# Patient Record
Sex: FEMALE | Race: WHITE | NOT HISPANIC OR LATINO | Employment: UNEMPLOYED | URBAN - METROPOLITAN AREA
[De-identification: names, ages, dates, MRNs, and addresses within clinical notes are randomized per-mention and may not be internally consistent; named-entity substitution may affect disease eponyms.]

---

## 2017-02-10 ENCOUNTER — HOSPITAL ENCOUNTER (EMERGENCY)
Facility: MEDICAL CENTER | Age: 27
End: 2017-02-10

## 2017-02-10 ENCOUNTER — HOSPITAL ENCOUNTER (EMERGENCY)
Facility: MEDICAL CENTER | Age: 27
End: 2017-02-10
Attending: EMERGENCY MEDICINE
Payer: MEDICAID

## 2017-02-10 ENCOUNTER — APPOINTMENT (OUTPATIENT)
Dept: RADIOLOGY | Facility: MEDICAL CENTER | Age: 27
End: 2017-02-10
Attending: EMERGENCY MEDICINE
Payer: MEDICAID

## 2017-02-10 VITALS
HEART RATE: 85 BPM | DIASTOLIC BLOOD PRESSURE: 65 MMHG | OXYGEN SATURATION: 97 % | RESPIRATION RATE: 16 BRPM | BODY MASS INDEX: 27.31 KG/M2 | TEMPERATURE: 97.9 F | WEIGHT: 160 LBS | HEIGHT: 64 IN | SYSTOLIC BLOOD PRESSURE: 132 MMHG

## 2017-02-10 DIAGNOSIS — S20.212A CHEST WALL CONTUSION, LEFT, INITIAL ENCOUNTER: ICD-10-CM
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
## 2017-02-10 PROCEDURE — 71010 DX-CHEST-LIMITED (1 VIEW): CPT

## 2017-02-10 PROCEDURE — 307740 HCHG GREEN TRAUMA TEAM SERVICES

## 2017-02-10 PROCEDURE — 99284 EMERGENCY DEPT VISIT MOD MDM: CPT

## 2017-02-10 NOTE — ED AVS SNAPSHOT
2/10/2017          Frances Panchal  No address on file.    Dear Frances:    [facility] wants to ensure your discharge home is safe and you or your loved ones have had all your questions answered regarding your care after you leave the hospital.    You may receive a telephone call within two days of your discharge.  This call is to make certain you understand your discharge instructions as well as ensure we provided you with the best care possible during your stay with us.     The call will only last approximately 3-5 minutes and will be done by a nurse.    Once again, we want to ensure your discharge home is safe and that you have a clear understanding of any next steps in your care.  If you have any questions or concerns, please do not hesitate to contact us, we are here for you.  Thank you for choosing [facility] for your healthcare needs.    Sincerely,    Lopez Rankin    [facility]

## 2017-02-10 NOTE — ED AVS SNAPSHOT
ColonaryConcepts Access Code: [email or web address]  Expires: 3/12/2017  8:31 PM    Your email address is not on file at [facility].  Email Addresses are required for you to sign up for ColonaryConcepts, please contact [phone] to verify your personal information and to provide your email address prior to attempting to register for ColonaryConcepts.    Frances Panchal  No address on file    ColonaryConcepts  A secure, online tool to manage your health information     [facility]’s ColonaryConcepts® is a secure, online tool that connects you to your personalized health information from the privacy of your home -- day or night - making it very easy for you to manage your healthcare. Once the activation process is completed, you can even access your medical information using the ColonaryConcepts gregorio, which is available for free in the Apple Gregorio store or Google Play store.     To learn more about ColonaryConcepts, visit [email or web address]    There are two levels of access available (as shown below):   My Chart Features  [facility] Primary Care Doctor [facility]  Specialists [facility]  Urgent  Care Non-[facility] Primary Care Doctor   Email your healthcare team securely and privately 24/7 X X X    Manage appointments: schedule your next appointment; view details of past/upcoming appointments X      Request prescription refills. X      View recent personal medical records, including lab and immunizations X X X X   View health record, including health history, allergies, medications X X X X   Read reports about your outpatient visits, procedures, consult and ER notes X X X X   See your discharge summary, which is a recap of your hospital and/or ER visit that includes your diagnosis, lab results, and care plan X X  X     How to register for [employer]:  Once your e-mail address has been verified, follow the following steps to sign up for ColonaryConcepts.     1. Go to  [email or web address]  2. Click on the Sign Up Now box, which takes you to the New Member Sign Up page. You will need to provide the  following information:  a. Enter your lark Access Code exactly as it appears at the top of this page. (You will not need to use this code after you’ve completed the sign-up process. If you do not sign up before the expiration date, you must request a new code.)   b. Enter your date of birth.   c. Enter your home email address.   d. Click Submit, and follow the next screen’s instructions.  3. Create a lark ID. This will be your lark login ID and cannot be changed, so think of one that is secure and easy to remember.  4. Create a lark password. You can change your password at any time.  5. Enter your Password Reset Question and Answer. This can be used at a later time if you forget your password.   6. Enter your e-mail address. This allows you to receive e-mail notifications when new information is available in lark.  7. Click Sign Up. You can now view your health information.    For assistance activating your lark account, call [phone]
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
## 2017-02-11 NOTE — ED NOTES
Discharge instructions provided to pt.  Pt states understanding.  Pt states all questions have been answered.  Copy of discharge provided to pt.  Signed copy in chart.  Pt states that all personal belongings are in possession.
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
## 2017-02-11 NOTE — DISCHARGE INSTRUCTIONS
Your xray was normal. You will feel sore for the next few days. Take tylenol or motrin as need for aches and pains. Return to the ER for severe symptoms.          Cervical Sprain  A cervical sprain is an injury in the neck in which the strong, fibrous tissues (ligaments) that connect your neck bones stretch or tear. Cervical sprains can range from mild to severe. Severe cervical sprains can cause the neck vertebrae to be unstable. This can lead to damage of the spinal cord and can result in serious nervous system problems. The amount of time it takes for a cervical sprain to get better depends on the cause and extent of the injury. Most cervical sprains heal in 1 to 3 weeks.  CAUSES   Severe cervical sprains may be caused by:   · Contact sport injuries (such as from football, rugby, wrestling, hockey, auto racing, gymnastics, diving, martial arts, or boxing).    · Motor vehicle collisions.    · Whiplash injuries. This is an injury from a sudden forward and backward whipping movement of the head and neck.   · Falls.    Mild cervical sprains may be caused by:   · Being in an awkward position, such as while cradling a telephone between your ear and shoulder.    · Sitting in a chair that does not offer proper support.    · Working at a poorly designed computer station.    · Looking up or down for long periods of time.    SYMPTOMS   · Pain, soreness, stiffness, or a burning sensation in the front, back, or sides of the neck. This discomfort may develop immediately after the injury or slowly, 24 hours or more after the injury.    · Pain or tenderness directly in the middle of the back of the neck.    · Shoulder or upper back pain.    · Limited ability to move the neck.    · Headache.    · Dizziness.    · Weakness, numbness, or tingling in the hands or arms.    · Muscle spasms.    · Difficulty swallowing or chewing.    · Tenderness and swelling of the neck.    DIAGNOSIS   Most of the time your health care provider can  diagnose a cervical sprain by taking your history and doing a physical exam. Your health care provider will ask about previous neck injuries and any known neck problems, such as arthritis in the neck. X-rays may be taken to find out if there are any other problems, such as with the bones of the neck. Other tests, such as a CT scan or MRI, may also be needed.   TREATMENT   Treatment depends on the severity of the cervical sprain. Mild sprains can be treated with rest, keeping the neck in place (immobilization), and pain medicines. Severe cervical sprains are immediately immobilized. Further treatment is done to help with pain, muscle spasms, and other symptoms and may include:  · Medicines, such as pain relievers, numbing medicines, or muscle relaxants.    · Physical therapy. This may involve stretching exercises, strengthening exercises, and posture training. Exercises and improved posture can help stabilize the neck, strengthen muscles, and help stop symptoms from returning.    HOME CARE INSTRUCTIONS   · Put ice on the injured area.    ¨ Put ice in a plastic bag.    ¨ Place a towel between your skin and the bag.    ¨ Leave the ice on for 15-20 minutes, 3-4 times a day.    · If your injury was severe, you may have been given a cervical collar to wear. A cervical collar is a two-piece collar designed to keep your neck from moving while it heals.  ¨ Do not remove the collar unless instructed by your health care provider.  ¨ If you have long hair, keep it outside of the collar.  ¨ Ask your health care provider before making any adjustments to your collar. Minor adjustments may be required over time to improve comfort and reduce pressure on your chin or on the back of your head.  ¨ If you are allowed to remove the collar for cleaning or bathing, follow your health care provider's instructions on how to do so safely.  ¨ Keep your collar clean by wiping it with mild soap and water and drying it completely. If the collar  you have been given includes removable pads, remove them every 1-2 days and hand wash them with soap and water. Allow them to air dry. They should be completely dry before you wear them in the collar.  ¨ If you are allowed to remove the collar for cleaning and bathing, wash and dry the skin of your neck. Check your skin for irritation or sores. If you see any, tell your health care provider.  ¨ Do not drive while wearing the collar.    · Only take over-the-counter or prescription medicines for pain, discomfort, or fever as directed by your health care provider.    · Keep all follow-up appointments as directed by your health care provider.    · Keep all physical therapy appointments as directed by your health care provider.    · Make any needed adjustments to your workstation to promote good posture.    · Avoid positions and activities that make your symptoms worse.    · Warm up and stretch before being active to help prevent problems.    SEEK MEDICAL CARE IF:   · Your pain is not controlled with medicine.    · You are unable to decrease your pain medicine over time as planned.    · Your activity level is not improving as expected.    SEEK IMMEDIATE MEDICAL CARE IF:   · You develop any bleeding.  · You develop stomach upset.  · You have signs of an allergic reaction to your medicine.    · Your symptoms get worse.    · You develop new, unexplained symptoms.    · You have numbness, tingling, weakness, or paralysis in any part of your body.    MAKE SURE YOU:   · Understand these instructions.  · Will watch your condition.  · Will get help right away if you are not doing well or get worse.     This information is not intended to replace advice given to you by your health care provider. Make sure you discuss any questions you have with your health care provider.     Document Released: 10/14/2008 Document Revised: 12/23/2014 Document Reviewed: 06/25/2014  [employer] Interactive Patient Education ©2016 [employer]
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
## 2017-05-07 ENCOUNTER — HOSPITAL ENCOUNTER (EMERGENCY)
Dept: HOSPITAL 8 - ED | Age: 27
Discharge: HOME | End: 2017-05-07
Payer: MEDICAID

## 2017-05-07 VITALS — HEIGHT: 64 IN | WEIGHT: 158.73 LBS | BODY MASS INDEX: 27.1 KG/M2

## 2017-05-07 VITALS — DIASTOLIC BLOOD PRESSURE: 70 MMHG | SYSTOLIC BLOOD PRESSURE: 118 MMHG

## 2017-05-07 DIAGNOSIS — H60.591: Primary | ICD-10-CM

## 2017-05-07 PROCEDURE — 99283 EMERGENCY DEPT VISIT LOW MDM: CPT

## 2017-12-22 ENCOUNTER — HOSPITAL ENCOUNTER (EMERGENCY)
Dept: HOSPITAL 8 - ED | Age: 27
Discharge: HOME | End: 2017-12-22
Payer: MEDICAID

## 2017-12-22 VITALS — SYSTOLIC BLOOD PRESSURE: 126 MMHG | DIASTOLIC BLOOD PRESSURE: 80 MMHG

## 2017-12-22 VITALS — BODY MASS INDEX: 26.81 KG/M2 | HEIGHT: 65 IN | WEIGHT: 160.94 LBS

## 2017-12-22 DIAGNOSIS — J02.8: Primary | ICD-10-CM

## 2017-12-22 PROCEDURE — 99282 EMERGENCY DEPT VISIT SF MDM: CPT

## 2018-06-02 ENCOUNTER — HOSPITAL ENCOUNTER (EMERGENCY)
Dept: HOSPITAL 8 - ED | Age: 28
Discharge: HOME | End: 2018-06-02
Payer: MEDICAID

## 2018-06-02 VITALS — BODY MASS INDEX: 27.02 KG/M2 | WEIGHT: 158.29 LBS | HEIGHT: 64 IN

## 2018-06-02 VITALS — DIASTOLIC BLOOD PRESSURE: 63 MMHG | SYSTOLIC BLOOD PRESSURE: 123 MMHG

## 2018-06-02 DIAGNOSIS — N30.90: Primary | ICD-10-CM

## 2018-06-02 LAB
CULTURE INDICATED?: YES
HCG UR SG: 1.03 (ref 1–1.03)
MICROSCOPIC: (no result)

## 2018-06-02 PROCEDURE — 81025 URINE PREGNANCY TEST: CPT

## 2018-06-02 PROCEDURE — 81001 URINALYSIS AUTO W/SCOPE: CPT

## 2018-06-02 PROCEDURE — 87086 URINE CULTURE/COLONY COUNT: CPT

## 2018-06-02 PROCEDURE — 99284 EMERGENCY DEPT VISIT MOD MDM: CPT

## 2018-07-12 ENCOUNTER — HOSPITAL ENCOUNTER (OUTPATIENT)
Facility: MEDICAL CENTER | Age: 28
End: 2018-07-12
Attending: FAMILY MEDICINE
Payer: MEDICAID

## 2018-07-12 ENCOUNTER — OFFICE VISIT (OUTPATIENT)
Dept: MEDICAL GROUP | Facility: MEDICAL CENTER | Age: 28
End: 2018-07-12
Attending: FAMILY MEDICINE
Payer: MEDICAID

## 2018-07-12 VITALS
OXYGEN SATURATION: 98 % | WEIGHT: 161 LBS | BODY MASS INDEX: 27.49 KG/M2 | RESPIRATION RATE: 16 BRPM | HEART RATE: 68 BPM | TEMPERATURE: 97.8 F | DIASTOLIC BLOOD PRESSURE: 64 MMHG | HEIGHT: 64 IN | SYSTOLIC BLOOD PRESSURE: 112 MMHG

## 2018-07-12 DIAGNOSIS — Z00.00 HEALTH CARE MAINTENANCE: ICD-10-CM

## 2018-07-12 DIAGNOSIS — R30.0 DYSURIA: ICD-10-CM

## 2018-07-12 DIAGNOSIS — R22.1 NECK MASS: ICD-10-CM

## 2018-07-12 DIAGNOSIS — N63.0 PERSISTENT NODULARITY OF BREAST: ICD-10-CM

## 2018-07-12 DIAGNOSIS — S29.019A THORACIC MYOFASCIAL STRAIN, INITIAL ENCOUNTER: ICD-10-CM

## 2018-07-12 DIAGNOSIS — F41.9 ANXIETY: ICD-10-CM

## 2018-07-12 LAB
APPEARANCE UR: CLEAR
BILIRUB UR STRIP-MCNC: NEGATIVE MG/DL
COLOR UR AUTO: YELLOW
GLUCOSE UR STRIP.AUTO-MCNC: NEGATIVE MG/DL
KETONES UR STRIP.AUTO-MCNC: NEGATIVE MG/DL
LEUKOCYTE ESTERASE UR QL STRIP.AUTO: NEGATIVE
NITRITE UR QL STRIP.AUTO: NEGATIVE
PH UR STRIP.AUTO: 6 [PH] (ref 5–8)
PROT UR QL STRIP: NEGATIVE MG/DL
RBC UR QL AUTO: NORMAL
SP GR UR STRIP.AUTO: 1.02
UROBILINOGEN UR STRIP-MCNC: NEGATIVE MG/DL

## 2018-07-12 PROCEDURE — 81002 URINALYSIS NONAUTO W/O SCOPE: CPT | Performed by: FAMILY MEDICINE

## 2018-07-12 PROCEDURE — 99204 OFFICE O/P NEW MOD 45 MIN: CPT | Performed by: FAMILY MEDICINE

## 2018-07-12 RX ORDER — MELOXICAM 7.5 MG/1
7.5 TABLET ORAL DAILY
Qty: 30 TAB | Refills: 2 | Status: SHIPPED | OUTPATIENT
Start: 2018-07-12 | End: 2021-04-22

## 2018-07-12 RX ORDER — METHOCARBAMOL 500 MG/1
500 TABLET, FILM COATED ORAL
Qty: 30 TAB | Refills: 2 | Status: SHIPPED | OUTPATIENT
Start: 2018-07-12 | End: 2021-03-29

## 2018-07-12 ASSESSMENT — PATIENT HEALTH QUESTIONNAIRE - PHQ9
CLINICAL INTERPRETATION OF PHQ2 SCORE: 2
5. POOR APPETITE OR OVEREATING: 1 - SEVERAL DAYS
SUM OF ALL RESPONSES TO PHQ QUESTIONS 1-9: 9

## 2018-07-12 NOTE — ASSESSMENT & PLAN NOTE
Patient reports very disturbing recurrent burning discomfort at the base of her neck and over her right shoulder blade. This oftentimes interferes with sleep at night.

## 2018-07-12 NOTE — ASSESSMENT & PLAN NOTE
Patient notes a nontender mass slightly visible on the anterior right base of the neck ×2 years. She is not reporting difficulty swallowing, unexplained hair loss or cold intolerance.

## 2018-07-12 NOTE — ASSESSMENT & PLAN NOTE
New patient presents today with concerns over recurrent upper right breast tenderness and breast nodularity to palpation over the past several months. She has a maternal aunt to have breast cancer and succumbed to that condition in her mid 40s. Patient notes that 4 years ago she briefly had a whitish discharge from her right nipple no bloody discharge and no recent discharge of any type.
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
## 2018-07-12 NOTE — ASSESSMENT & PLAN NOTE
Patient is noting a daily high level of anxiety at times leading to irritability on almost a daily basis. 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

## 2018-07-12 NOTE — LETTER
July 12, 2018    Re:    Frances Panchal  [address] [place] [address]        Dear Sir,    For medical reasons a land is being recommended to restrict work days to 8 hours or less per day, no more than 4 days per week.            Sincerely,        Devyn Mendoza M.D.    Electronically Signed

## 2018-07-12 NOTE — ASSESSMENT & PLAN NOTE
Patient notes feeling of incomplete emptying dating back over the past 4 weeks or so. She was given apparently a prescription of Pyridium for 5 days at a visit to [facility]'s emergency room about 3 weeks ago. Reports that symptom is persistent. Denies outright dysuria or unusual urinary frequency.

## 2018-07-13 DIAGNOSIS — R30.0 DYSURIA: ICD-10-CM

## 2018-07-24 ENCOUNTER — APPOINTMENT (OUTPATIENT)
Dept: RADIOLOGY | Facility: MEDICAL CENTER | Age: 28
End: 2018-07-24
Attending: FAMILY MEDICINE
Payer: MEDICAID

## 2018-07-26 ENCOUNTER — OFFICE VISIT (OUTPATIENT)
Dept: MEDICAL GROUP | Facility: MEDICAL CENTER | Age: 28
End: 2018-07-26
Attending: FAMILY MEDICINE
Payer: MEDICAID

## 2018-07-26 ENCOUNTER — APPOINTMENT (OUTPATIENT)
Dept: RADIOLOGY | Facility: MEDICAL CENTER | Age: 28
End: 2018-07-26
Attending: FAMILY MEDICINE
Payer: MEDICAID

## 2018-07-26 VITALS
OXYGEN SATURATION: 93 % | DIASTOLIC BLOOD PRESSURE: 68 MMHG | TEMPERATURE: 98.3 F | BODY MASS INDEX: 28 KG/M2 | RESPIRATION RATE: 16 BRPM | HEART RATE: 68 BPM | SYSTOLIC BLOOD PRESSURE: 110 MMHG | WEIGHT: 164 LBS | HEIGHT: 64 IN

## 2018-07-26 DIAGNOSIS — Z12.4 SCREENING FOR MALIGNANT NEOPLASM OF CERVIX: ICD-10-CM

## 2018-07-26 DIAGNOSIS — Z01.419 WELL WOMAN EXAM: ICD-10-CM

## 2018-07-26 DIAGNOSIS — N64.4 BREAST PAIN: ICD-10-CM

## 2018-07-26 PROCEDURE — G0101 CA SCREEN;PELVIC/BREAST EXAM: HCPCS | Performed by: FAMILY MEDICINE

## 2018-07-26 PROCEDURE — 99213 OFFICE O/P EST LOW 20 MIN: CPT | Performed by: FAMILY MEDICINE

## 2018-07-26 ASSESSMENT — PAIN SCALES - GENERAL: PAINLEVEL: NO PAIN

## 2018-07-26 NOTE — PROGRESS NOTES
No chief complaint on file.      HPI: 1. Persistent right breast tenderness-patient reports proximal urinary half of tenderness in the superior midline portion of her right breast. Occasionally will note tenderness in her left breast. If she pushes on the inferior portion of the right breast she feels some nodularity in the upper inner quadrant of the right breast. There's been no recent breast nipple discharge. Patient's  aunt  of breast cancer in her 40s.  Social history-, homemaker  Social History     Social History   • Marital status:      Spouse name: N/A   • Number of children: N/A   • Years of education: N/A     Occupational History   • Not on file.     Social History Main Topics   • Smoking status: Former Smoker     Types: Cigarettes     Quit date: 2012   • Smokeless tobacco: Never Used   • Alcohol use No   • Drug use: Yes      Comment: rare   • Sexual activity: Yes     Partners: Male     Other Topics Concern   • Not on file     Social History Narrative   • No narrative on file       No results for input(s): HEMOGLOBIN, HEMATOCRIT, PLATELETCT, SODIUM, POTASSIUM, CHLORIDE, GLUCOSE, BUN, CREATININE in the last 72 hours.    ROS:GEN-no fever, weight loss SKIN-no rash, pruritus HENT-no ear pain, sore throat EYE-no double vision, eye discharge CV-negative for orthopnea, chest pain RESP-negative for cough, wheezing GI-negative for melena, constipation, nausea -negative for dysuria, hematuria NEURO-negative for dizziness, tremor END/HEM-negative for bruisability, bleeding, polydipsia    Current Outpatient Prescriptions on File Prior to Visit   Medication Sig Dispense Refill   • methocarbamol (ROBAXIN) 500 MG Tab Take 1 Tab by mouth at bedtime as needed. 30 Tab 2   • meloxicam (MOBIC) 7.5 MG Tab Take 1 Tab by mouth every day. 30 Tab 2     No current facility-administered medications on file prior to visit.          Physical Exam:Blood pressure 110/68, pulse 68, temperature 36.8 °C (98.3 °F),  "resp. rate 16, height 1.626 m (5' 4\"), weight 74.4 kg (164 lb), last menstrual period 07/09/2018, SpO2 93 %.  \"Gen.- alert, cooperative, in no acute distress  Neck- midline trachea, thyroid not enlarged or tender,supple, no cervical adenopathy  Chest-clear to auscultation and percussion with normal breath sounds. No retractions. Chest wall nontender  Cardiac- regular rhythm and rate. No murmur, thrill, or heave  Abdomen-Abdomen is soft, nontender, normal bowel sounds, no masses, guarding, or organomegaly.  Skin-Skin is clear without rash, redness, or cyanosis.  HEENT-Skull is A/N. TMs and canals are clear. Oropharynx shows intact lips and dentition, tongue is midline, mucosa is pink and moist.   Eyes- show flat discs and normal vessels, clear conjunctiva and sclera, EOMI, PERRL. Lids and and lashes are clear  Breast- Normal contours without redness or dimpling. No palpable masses, or nipple discharge. No axillary adenopathy. Patient reports mild tenderness on palpation over the upper inner quadrant of the right breast.  -normal female external genitalia. Vagina shows pink moist mucosa without purulent discharge. Cervix is nontender. Adnexa are nontender and not enlarged on palpation. Uterus is midline and nontender by palpation.  Neuro- intact light touch. Intact strength bilaterally. Normal gait. No tremor. Normal speech     Assessment:  1. Breast pain     2. Well woman exam         Plan: 1. Diagnostic mammogram to more completely evaluate the right upper breast  2. Check on pending Pap smear  3. Revisit in 2 weeks      "

## 2018-08-02 ENCOUNTER — TELEPHONE (OUTPATIENT)
Dept: MEDICAL GROUP | Facility: MEDICAL CENTER | Age: 28
End: 2018-08-02

## 2018-08-02 RX ORDER — NITROFURANTOIN MACROCRYSTALS 100 MG/1
100 CAPSULE ORAL 3 TIMES DAILY
Qty: 30 CAP | Refills: 0 | Status: SHIPPED | OUTPATIENT
Start: 2018-08-02 | End: 2019-01-14

## 2018-08-02 NOTE — TELEPHONE ENCOUNTER
Urine culture which was final on 7/16 shows light growth of a possible pathogen. We will have patient start nitrofurantoin 100 mg 3 times daily ×10 days to treat that. Pap smear shows mildly abnormal cervical cells of uncertain significance. I would like to repeat her Pap in 6 months. Oftentimes these will clear up. If she has had abnormal Paps in the past however we can consider an alternative which would be a referral to GYN for possible colposcopy. Cholesterol and triglyceride levels are excellent. Thyroid level is normal. [facility].

## 2018-08-08 ENCOUNTER — HOSPITAL ENCOUNTER (OUTPATIENT)
Dept: RADIOLOGY | Facility: MEDICAL CENTER | Age: 28
End: 2018-08-08
Attending: FAMILY MEDICINE
Payer: MEDICAID

## 2018-08-08 DIAGNOSIS — S29.019A THORACIC MYOFASCIAL STRAIN, INITIAL ENCOUNTER: ICD-10-CM

## 2018-08-08 DIAGNOSIS — R22.1 NECK MASS: ICD-10-CM

## 2018-08-08 DIAGNOSIS — N64.4 BREAST PAIN: ICD-10-CM

## 2018-08-08 PROCEDURE — 76536 US EXAM OF HEAD AND NECK: CPT

## 2018-08-08 PROCEDURE — 76642 ULTRASOUND BREAST LIMITED: CPT | Mod: RT

## 2018-08-08 PROCEDURE — 72040 X-RAY EXAM NECK SPINE 2-3 VW: CPT

## 2018-08-09 ENCOUNTER — TELEPHONE (OUTPATIENT)
Dept: MEDICAL GROUP | Facility: MEDICAL CENTER | Age: 28
End: 2018-08-09

## 2018-08-09 NOTE — TELEPHONE ENCOUNTER
----- Message from Devyn Mendoza M.D. sent at 8/9/2018  6:42 AM PDT -----  Cervical spine x-ray is unremarkable with no significant disc space narrowing or other anatomic change.

## 2018-11-01 ENCOUNTER — PATIENT MESSAGE (OUTPATIENT)
Dept: MEDICAL GROUP | Facility: MEDICAL CENTER | Age: 28
End: 2018-11-01

## 2018-11-01 ENCOUNTER — TELEPHONE (OUTPATIENT)
Dept: MEDICAL GROUP | Facility: MEDICAL CENTER | Age: 28
End: 2018-11-01

## 2018-11-08 ENCOUNTER — PATIENT MESSAGE (OUTPATIENT)
Dept: MEDICAL GROUP | Facility: MEDICAL CENTER | Age: 28
End: 2018-11-08

## 2018-11-22 ENCOUNTER — HOSPITAL ENCOUNTER (EMERGENCY)
Dept: HOSPITAL 8 - ED | Age: 28
Discharge: HOME | End: 2018-11-22
Payer: MEDICAID

## 2018-11-22 VITALS — DIASTOLIC BLOOD PRESSURE: 62 MMHG | SYSTOLIC BLOOD PRESSURE: 112 MMHG

## 2018-11-22 VITALS — BODY MASS INDEX: 27.4 KG/M2 | WEIGHT: 160.5 LBS | HEIGHT: 64 IN

## 2018-11-22 DIAGNOSIS — R10.32: Primary | ICD-10-CM

## 2018-11-22 DIAGNOSIS — Z87.440: ICD-10-CM

## 2018-11-22 LAB
ALBUMIN SERPL-MCNC: 4.2 G/DL (ref 3.4–5)
ALP SERPL-CCNC: 63 U/L (ref 45–117)
ALT SERPL-CCNC: 31 U/L (ref 12–78)
ANION GAP SERPL CALC-SCNC: 6 MMOL/L (ref 5–15)
BASOPHILS # BLD AUTO: 0.06 X10^3/UL (ref 0–0.1)
BASOPHILS NFR BLD AUTO: 1 % (ref 0–1)
BILIRUB SERPL-MCNC: 0.4 MG/DL (ref 0.2–1)
CALCIUM SERPL-MCNC: 8.9 MG/DL (ref 8.5–10.1)
CHLORIDE SERPL-SCNC: 107 MMOL/L (ref 98–107)
CREAT SERPL-MCNC: 0.98 MG/DL (ref 0.55–1.02)
CULTURE INDICATED?: YES
EOSINOPHIL # BLD AUTO: 0.06 X10^3/UL (ref 0–0.4)
EOSINOPHIL NFR BLD AUTO: 1 % (ref 1–7)
ERYTHROCYTE [DISTWIDTH] IN BLOOD BY AUTOMATED COUNT: 13.5 % (ref 9.6–15.2)
HCG UR SG: 1.02 (ref 1–1.03)
LYMPHOCYTES # BLD AUTO: 4.43 X10^3/UL (ref 1–3.4)
LYMPHOCYTES NFR BLD AUTO: 37 % (ref 22–44)
MCH RBC QN AUTO: 30.9 PG (ref 27–34.8)
MCHC RBC AUTO-ENTMCNC: 34.4 G/DL (ref 32.4–35.8)
MCV RBC AUTO: 89.8 FL (ref 80–100)
MD: NO
MICROSCOPIC: (no result)
MONOCYTES # BLD AUTO: 0.71 X10^3/UL (ref 0.2–0.8)
MONOCYTES NFR BLD AUTO: 6 % (ref 2–9)
NEUTROPHILS # BLD AUTO: 6.58 X10^3/UL (ref 1.8–6.8)
NEUTROPHILS NFR BLD AUTO: 56 % (ref 42–75)
PLATELET # BLD AUTO: 255 X10^3/UL (ref 130–400)
PMV BLD AUTO: 8.3 FL (ref 7.4–10.4)
PROT SERPL-MCNC: 7.4 G/DL (ref 6.4–8.2)
RBC # BLD AUTO: 4.63 X10^6/UL (ref 3.82–5.3)

## 2018-11-22 PROCEDURE — 74176 CT ABD & PELVIS W/O CONTRAST: CPT

## 2018-11-22 PROCEDURE — 36415 COLL VENOUS BLD VENIPUNCTURE: CPT

## 2018-11-22 PROCEDURE — 87086 URINE CULTURE/COLONY COUNT: CPT

## 2018-11-22 PROCEDURE — 85025 COMPLETE CBC W/AUTO DIFF WBC: CPT

## 2018-11-22 PROCEDURE — 81001 URINALYSIS AUTO W/SCOPE: CPT

## 2018-11-22 PROCEDURE — 81025 URINE PREGNANCY TEST: CPT

## 2018-11-22 PROCEDURE — 99284 EMERGENCY DEPT VISIT MOD MDM: CPT

## 2018-11-22 PROCEDURE — 83690 ASSAY OF LIPASE: CPT

## 2018-11-22 PROCEDURE — 80053 COMPREHEN METABOLIC PANEL: CPT

## 2018-12-04 ENCOUNTER — PATIENT MESSAGE (OUTPATIENT)
Dept: MEDICAL GROUP | Facility: MEDICAL CENTER | Age: 28
End: 2018-12-04

## 2018-12-05 NOTE — TELEPHONE ENCOUNTER
From: Frances Panchal  To: Devyn Mendoza M.D.  Sent: 12/4/2018 2:37 PM PST  Subject: RE: Non-Urgent Medical Question    And I was talking about my partner/spouse The, not our children     ----- Message -----  From: Devyn Mendoza M.D.  Sent: 12/4/18 2:34 PM  To: Frances Panchal  Subject: RE: Non-Urgent Medical Question    Francespriti whoops here. I did not read your message corrrectly, and now notice you question was about HPV, not HIV, obviously very different issues. My apologies. With regard to HPV, we don't routinely test children. There is a recommended vaccine series for HPV, but its not approved under age 11 years. Otherwise we just watch for any warts on the skin, which are caused by HPV, and treat when discovered with liquid nitrogen freezing. Dr SHEPPARD    ----- Message -----   From: rFances Panchal   Sent: 12/4/2018 11:51 AM PST   To: Devyn Mendoza M.D.  Subject: Non-Urgent Medical Question    I just have a question, so since I am hpv-positive does Yoel have to be tested for HPV too? I just had my appointment yesterday with Dr Blake for the abnormal pap and he had brought that up.

## 2019-01-14 ENCOUNTER — OFFICE VISIT (OUTPATIENT)
Dept: MEDICAL GROUP | Facility: MEDICAL CENTER | Age: 29
End: 2019-01-14
Attending: FAMILY MEDICINE
Payer: MEDICAID

## 2019-01-14 VITALS
HEART RATE: 60 BPM | BODY MASS INDEX: 28.68 KG/M2 | TEMPERATURE: 97.7 F | HEIGHT: 64 IN | WEIGHT: 168 LBS | RESPIRATION RATE: 16 BRPM | SYSTOLIC BLOOD PRESSURE: 112 MMHG | DIASTOLIC BLOOD PRESSURE: 64 MMHG | OXYGEN SATURATION: 99 %

## 2019-01-14 DIAGNOSIS — R35.0 URINARY FREQUENCY: ICD-10-CM

## 2019-01-14 LAB
APPEARANCE UR: CLEAR
BILIRUB UR STRIP-MCNC: NORMAL MG/DL
COLOR UR AUTO: YELLOW
GLUCOSE UR STRIP.AUTO-MCNC: NORMAL MG/DL
KETONES UR STRIP.AUTO-MCNC: NORMAL MG/DL
LEUKOCYTE ESTERASE UR QL STRIP.AUTO: NORMAL
NITRITE UR QL STRIP.AUTO: NORMAL
PH UR STRIP.AUTO: 8 [PH] (ref 5–8)
PROT UR QL STRIP: NORMAL MG/DL
RBC UR QL AUTO: NORMAL
SP GR UR STRIP.AUTO: 1.02
UROBILINOGEN UR STRIP-MCNC: 0.2 MG/DL

## 2019-01-14 PROCEDURE — 81002 URINALYSIS NONAUTO W/O SCOPE: CPT | Performed by: FAMILY MEDICINE

## 2019-01-14 PROCEDURE — 99213 OFFICE O/P EST LOW 20 MIN: CPT | Performed by: FAMILY MEDICINE

## 2019-01-14 ASSESSMENT — PATIENT HEALTH QUESTIONNAIRE - PHQ9
5. POOR APPETITE OR OVEREATING: 0 - NOT AT ALL
CLINICAL INTERPRETATION OF PHQ2 SCORE: 1
SUM OF ALL RESPONSES TO PHQ QUESTIONS 1-9: 3

## 2019-01-14 NOTE — PROGRESS NOTES
"Subjective:      Frances Panchal is a 28 y.o. female who presents with UTI            HPI 1.  Urinary frequency-patient is a 28-year-old  3 para 3 female who reports 3-month history of a sensation of incomplete emptying after micturition and urinary frequency.  She has not had any grossly bloody urine or obvious dysuria.  She was treated with a course of Macrodantin in August, and did have moderate hematuria on a dipstick test from 17.  She was seen approximately November by Dr. Blake, gynecology, and placed on an antibiotic at that time for a urinary tract infection as well.  She does not know whether a culture was obtained.    ROS negative for flank pain, current diarrhea, urinary incontinence, fecal incontinence       Objective:     /64 (BP Location: Left arm, Patient Position: Sitting, BP Cuff Size: Adult)   Pulse 60   Temp 36.5 °C (97.7 °F) (Temporal)   Resp 16   Ht 1.626 m (5' 4.02\")   Wt 76.2 kg (168 lb)   SpO2 99%   BMI 28.82 kg/m²      Physical Exam  General-alert cooperative female in no acute distress  Back-nontender to palpation and percussion over the midline and CVA areas  Abdomen- normal bowel sounds, soft without guarding. Liver and spleen not enlarged, no palpable masses or tenderness.  Specifically there is no suprapubic tenderness or palpable mass     UA-negative for WBCs, nitrite, RBCs     Assessment/Plan:     1. Urinary frequency-current physical exam and urinalysis are unremarkable.  Patient would like to observe and promises to report back if she has increasing urinary symptoms    "

## 2019-08-02 ENCOUNTER — TELEPHONE (OUTPATIENT)
Dept: MEDICAL GROUP | Facility: MEDICAL CENTER | Age: 29
End: 2019-08-02

## 2019-11-26 ENCOUNTER — OFFICE VISIT (OUTPATIENT)
Dept: MEDICAL GROUP | Facility: MEDICAL CENTER | Age: 29
End: 2019-11-26
Attending: FAMILY MEDICINE
Payer: MEDICAID

## 2019-11-26 VITALS
TEMPERATURE: 97.5 F | HEART RATE: 60 BPM | RESPIRATION RATE: 16 BRPM | SYSTOLIC BLOOD PRESSURE: 122 MMHG | BODY MASS INDEX: 27.83 KG/M2 | HEIGHT: 64 IN | DIASTOLIC BLOOD PRESSURE: 64 MMHG | OXYGEN SATURATION: 98 % | WEIGHT: 163 LBS

## 2019-11-26 DIAGNOSIS — Z12.4 SCREENING FOR MALIGNANT NEOPLASM OF CERVIX: ICD-10-CM

## 2019-11-26 DIAGNOSIS — Z01.419 WELL WOMAN EXAM WITH ROUTINE GYNECOLOGICAL EXAM: ICD-10-CM

## 2019-11-26 PROCEDURE — G0101 CA SCREEN;PELVIC/BREAST EXAM: HCPCS | Performed by: FAMILY MEDICINE

## 2019-11-26 PROCEDURE — 99213 OFFICE O/P EST LOW 20 MIN: CPT | Performed by: FAMILY MEDICINE

## 2019-11-26 RX ORDER — TOLTERODINE 4 MG/1
4 CAPSULE, EXTENDED RELEASE ORAL DAILY
Qty: 30 CAP | Refills: 3 | Status: SHIPPED | OUTPATIENT
Start: 2019-11-26 | End: 2021-03-29

## 2019-11-26 RX ORDER — TOLTERODINE 4 MG/1
4 CAPSULE, EXTENDED RELEASE ORAL DAILY
Qty: 30 CAP | Refills: 3 | Status: SHIPPED | OUTPATIENT
Start: 2019-11-26 | End: 2019-11-26 | Stop reason: SDUPTHER

## 2019-11-26 NOTE — PROGRESS NOTES
No chief complaint on file.      HPI: 1.  Well woman visit-patient had abnormal Pap smear screening (LSIL) 1 year ago.  Presents for repeat Pap smear.  She also reports over the past year she has had increased volume of vaginal discharge with no odor or blood.  Menstrual cycles have been unaffected.  Patient denies any vaginal itching burning or tenderness.  There is no dyspareunia.  There is no dysuria or hematuria.  2.  Urinary incontinence-patient reports urinary incontinence with coughing laughing or sneezing for the past several months.  She did have 3 children delivered by vaginal route, largest weighing 8-1/2 pounds, 12 years ago.  Social History     Socioeconomic History   • Marital status:      Spouse name: Not on file   • Number of children: Not on file   • Years of education: Not on file   • Highest education level: Not on file   Occupational History   • Not on file   Social Needs   • Financial resource strain: Not on file   • Food insecurity:     Worry: Not on file     Inability: Not on file   • Transportation needs:     Medical: Not on file     Non-medical: Not on file   Tobacco Use   • Smoking status: Former Smoker     Types: Cigarettes     Last attempt to quit: 2012     Years since quittin.3   • Smokeless tobacco: Never Used   Substance and Sexual Activity   • Alcohol use: No   • Drug use: Yes     Comment: rare   • Sexual activity: Yes     Partners: Male   Lifestyle   • Physical activity:     Days per week: Not on file     Minutes per session: Not on file   • Stress: Not on file   Relationships   • Social connections:     Talks on phone: Not on file     Gets together: Not on file     Attends [employer] service: Not on file     Active member of club or organization: Not on file     Attends meetings of clubs or organizations: Not on file     Relationship status: Not on file   • Intimate partner violence:     Fear of current or ex partner: Not on file     Emotionally abused: Not on file     " Physically abused: Not on file     Forced sexual activity: Not on file   Other Topics Concern   • Not on file   Social History Narrative   • Not on file       No results for input(s): HEMOGLOBIN, HEMATOCRIT, PLATELETCT, SODIUM, POTASSIUM, CHLORIDE, GLUCOSE, BUN, CREATININE in the last 72 hours.    ROS:GEN-no fever, weight loss SKIN-no rash, pruritus HENT-no ear pain, sore throat EYE-no double vision, eye discharge CV-negative for orthopnea, chest pain RESP-negative for cough, wheezing GI-negative for melena, constipation, nausea -negative for dysuria, hematuria NEURO-negative for dizziness, tremor END/HEM-negative for bruisability, bleeding, polydipsia    Current Outpatient Medications on File Prior to Visit   Medication Sig Dispense Refill   • methocarbamol (ROBAXIN) 500 MG Tab Take 1 Tab by mouth at bedtime as needed. 30 Tab 2   • meloxicam (MOBIC) 7.5 MG Tab Take 1 Tab by mouth every day. 30 Tab 2     No current facility-administered medications on file prior to visit.          Physical Exam:/64 (BP Location: Left arm, Patient Position: Sitting, BP Cuff Size: Adult)   Pulse 60   Temp 36.4 °C (97.5 °F) (Temporal)   Resp 16   Ht 1.626 m (5' 4.02\")   Wt 73.9 kg (163 lb)   SpO2 98%   \"Gen.- alert, cooperative, in no acute distress  Neck- midline trachea, thyroid not enlarged or tender,supple, no cervical adenopathy  Skin-Skin is clear without rash, redness, or cyanosis.  HEENT-Skull is A/N. TMs and canals are clear. Oropharynx shows intact lips and dentition, tongue is midline, mucosa is pink and moist.   Eyes- show flat discs and normal vessels, clear conjunctiva and sclera, EOMI, PERRL. Lids and and lashes are clear  -normal female external genitalia. Vagina shows pink moist mucosa without purulent discharge. Cervix is nontender. Adnexa are nontender and not enlarged on palpation. Uterus is midline and nontender by palpation.  Neuro- intact light touch. Intact strength bilaterally. Normal gait. No " tremor. Normal speech     Assessment:  1. Well woman exam with routine gynecological exam         Plan: 1.  Check pending Pap smear  2.  Trial of Detrol LA 4 mg

## 2019-11-26 NOTE — LETTER
November 26, 2019    Re:    Frances Panchal  [address]  [place] [address]        Dear ma'am,  Frances is the owner of a cat that qualifies as an emotional support animal            Sincerely,        Devyn Mendoza M.D.    Electronically Signed

## 2019-11-26 NOTE — LETTER
November 26, 2019    Re:    Frances Panchal  [address] [place] [address]        Dear Sir,    Shagufta has a cat that qualifies as an emotional support animal.            Sincerely,        Devyn Mendoza M.D.    Electronically Signed

## 2019-12-03 ENCOUNTER — PATIENT MESSAGE (OUTPATIENT)
Dept: MEDICAL GROUP | Facility: MEDICAL CENTER | Age: 29
End: 2019-12-03

## 2019-12-03 ENCOUNTER — TELEPHONE (OUTPATIENT)
Dept: MEDICAL GROUP | Facility: MEDICAL CENTER | Age: 29
End: 2019-12-03

## 2019-12-04 NOTE — TELEPHONE ENCOUNTER
FINAL PRIOR AUTHORIZATION STATUS:    1.  Name of Medication & Dose: Tolterodine ER     2. Prior Auth Status: Denied.  Reason: not covered under formulary please see scanned documentation     3. Action Taken: Pharmacy Notified: yes Patient Notified: yes

## 2019-12-04 NOTE — TELEPHONE ENCOUNTER
Your insurance company has requested an additional authorization for your detrol LA. We have initiated a prior authorization process, and we expect to complete this in 7-10 business days. On rare occasions prior authorizations may take longer than expected.      The status of your prior authorization for detrol LA is: pending  on 12/3/2019     Here is the information we have for you on file:    Insurance company: Medicaid HPN  Pharmacy:   [id number] - [place] [address]  Phone: [phone] Fax: [phone]      If you have any questions or if any of this information is incorrect, please don't hesitate to respond to this [employer] email.

## 2019-12-05 ENCOUNTER — TELEPHONE (OUTPATIENT)
Dept: MEDICAL GROUP | Facility: MEDICAL CENTER | Age: 29
End: 2019-12-05

## 2019-12-05 RX ORDER — TOLTERODINE TARTRATE 2 MG/1
2 TABLET, EXTENDED RELEASE ORAL 2 TIMES DAILY
Qty: 60 TAB | Refills: 6 | Status: SHIPPED | OUTPATIENT
Start: 2019-12-05 | End: 2021-03-29

## 2019-12-05 NOTE — TELEPHONE ENCOUNTER
Current Pap smear collected on 11/26/2019 is normal, last year's abnormality has cleared.  Suggest repeat Pap exam in 1 year

## 2019-12-10 ENCOUNTER — OFFICE VISIT (OUTPATIENT)
Dept: MEDICAL GROUP | Facility: MEDICAL CENTER | Age: 29
End: 2019-12-10
Attending: FAMILY MEDICINE
Payer: MEDICAID

## 2019-12-10 VITALS
OXYGEN SATURATION: 96 % | BODY MASS INDEX: 27.78 KG/M2 | TEMPERATURE: 100.1 F | RESPIRATION RATE: 16 BRPM | HEIGHT: 64 IN | WEIGHT: 162.7 LBS | DIASTOLIC BLOOD PRESSURE: 60 MMHG | SYSTOLIC BLOOD PRESSURE: 120 MMHG | HEART RATE: 93 BPM

## 2019-12-10 DIAGNOSIS — R68.89 FLU-LIKE SYMPTOMS: ICD-10-CM

## 2019-12-10 PROCEDURE — 99213 OFFICE O/P EST LOW 20 MIN: CPT | Performed by: FAMILY MEDICINE

## 2019-12-10 PROCEDURE — 99212 OFFICE O/P EST SF 10 MIN: CPT | Performed by: FAMILY MEDICINE

## 2019-12-10 RX ORDER — BENZONATATE 200 MG/1
200 CAPSULE ORAL 3 TIMES DAILY PRN
Qty: 30 CAP | Refills: 0 | Status: SHIPPED | OUTPATIENT
Start: 2019-12-10 | End: 2021-03-29

## 2019-12-10 NOTE — LETTER
December 10, 2019    Re:    Frances Panchal  [address] [address]        Dear Sir,    Frances is excused 12/10/2019 due to acute illness            Sincerely,        Devyn Mendoza M.D.    Electronically Signed

## 2019-12-10 NOTE — LETTER
December 10, 2019    Re:    Frances Panchal  [address] [place] [address]        Dear ,    Shagufta is excused 12/10/2019 due to acute illness            Sincerely,        Devyn Mendoza M.D.    Electronically Signed

## 2019-12-10 NOTE — PROGRESS NOTES
"Subjective:      Frances Panchal is a 29 y.o. female who presents with Cough            HPI 1.  Flu syndrome-patient reports onset 2 days ago of frequent mostly nonproductive cough with rare brown to green sputum.  Also reports lightheadedness, multiple body aches, hot and cold episodes.  Patient's  has had similar symptoms beginning about 3 to 4 days prior.    ROS positive for nausea without emesis.  Negative for dysuria or rash       Objective:     /60 (BP Location: Right arm, Patient Position: Sitting, BP Cuff Size: Adult)   Pulse 93   Temp 37.8 °C (100.1 °F) (Temporal)   Resp 16   Ht 1.626 m (5' 4\")   Wt 73.8 kg (162 lb 11.2 oz)   SpO2 96%   BMI 27.93 kg/m²      Physical Exam  General- alert,cooperative patient in no acute distress  Ears- normal tms without redness, perforation. Canals unremarkable  Nares- clear, pink, moist mucosa without bleeding. No purulent nasal DC  Orophx.- lips normal. Clear, pink, moist mucosa without redness or exudate. Tongue is midline  Chest-Normal to auscultation and percussion, movement is symmetric. Nontender to palpation.  Sinuses-nontender to palpation over the frontal and maxillary areas            Assessment/Plan:       1. Flu-like symptoms    Plan: 1.  Discussed the importance of supportive care i.e. high fluid intake, Tylenol or NSAID for body aches or headache.  2.  Tessalon 200 mg 3 times daily to sooth intensity of cough  3.  Follow-up as needed  "

## 2019-12-11 ENCOUNTER — PATIENT MESSAGE (OUTPATIENT)
Dept: MEDICAL GROUP | Facility: MEDICAL CENTER | Age: 29
End: 2019-12-11

## 2019-12-12 RX ORDER — OSELTAMIVIR PHOSPHATE 75 MG/1
75 CAPSULE ORAL 2 TIMES DAILY
Qty: 10 CAP | Refills: 0 | Status: SHIPPED | OUTPATIENT
Start: 2019-12-12 | End: 2021-03-29

## 2019-12-17 ENCOUNTER — TELEPHONE (OUTPATIENT)
Dept: MEDICAL GROUP | Facility: MEDICAL CENTER | Age: 29
End: 2019-12-17

## 2019-12-17 RX ORDER — TOLTERODINE TARTRATE 2 MG/1
2 TABLET, EXTENDED RELEASE ORAL 2 TIMES DAILY
Qty: 60 TAB | Refills: 11 | Status: SHIPPED | OUTPATIENT
Start: 2019-12-17 | End: 2021-03-29

## 2019-12-18 NOTE — TELEPHONE ENCOUNTER
Please let patient know that ironically her health plan will only cover the twice a day short acting version of Detrol whereas some other Medicaid's cover it exactly the opposite.  Therefore we have sent in twice a day prescription request directly to her pharmacy.  Dr. Davila

## 2019-12-20 ENCOUNTER — PATIENT MESSAGE (OUTPATIENT)
Dept: MEDICAL GROUP | Facility: MEDICAL CENTER | Age: 29
End: 2019-12-20

## 2019-12-20 ENCOUNTER — TELEPHONE (OUTPATIENT)
Dept: MEDICAL GROUP | Facility: MEDICAL CENTER | Age: 29
End: 2019-12-20

## 2019-12-24 ENCOUNTER — TELEPHONE (OUTPATIENT)
Dept: MEDICAL GROUP | Facility: MEDICAL CENTER | Age: 29
End: 2019-12-24

## 2019-12-24 NOTE — TELEPHONE ENCOUNTER
DOCUMENTATION OF PAR STATUS:    1. Name of Medication & Dose: Tolterodine Tartrate 2 mg     2. Name of Prescription Coverage Company & phone #: Medicaid HPN    3. Date Prior Auth Submitted: 12/24/19    4. What information was given to obtain insurance decision? Diagnosis Etc.     5. Prior Auth Status? Pending    6. Patient Notified: yes

## 2019-12-26 ENCOUNTER — HOSPITAL ENCOUNTER (OUTPATIENT)
Dept: RADIOLOGY | Facility: MEDICAL CENTER | Age: 29
End: 2019-12-26
Attending: FAMILY MEDICINE
Payer: MEDICAID

## 2019-12-26 ENCOUNTER — OFFICE VISIT (OUTPATIENT)
Dept: MEDICAL GROUP | Facility: MEDICAL CENTER | Age: 29
End: 2019-12-26
Attending: FAMILY MEDICINE
Payer: MEDICAID

## 2019-12-26 VITALS
OXYGEN SATURATION: 95 % | TEMPERATURE: 97.9 F | HEIGHT: 64 IN | SYSTOLIC BLOOD PRESSURE: 100 MMHG | DIASTOLIC BLOOD PRESSURE: 60 MMHG | BODY MASS INDEX: 27.86 KG/M2 | RESPIRATION RATE: 16 BRPM | WEIGHT: 163.2 LBS | HEART RATE: 81 BPM

## 2019-12-26 DIAGNOSIS — S39.012A ACUTE MYOFASCIAL STRAIN OF LUMBAR REGION, INITIAL ENCOUNTER: ICD-10-CM

## 2019-12-26 PROCEDURE — 72110 X-RAY EXAM L-2 SPINE 4/>VWS: CPT

## 2019-12-26 PROCEDURE — 99213 OFFICE O/P EST LOW 20 MIN: CPT | Performed by: FAMILY MEDICINE

## 2019-12-26 RX ORDER — METHOCARBAMOL 500 MG/1
500 TABLET, FILM COATED ORAL 4 TIMES DAILY
Qty: 60 TAB | Refills: 0 | Status: SHIPPED | OUTPATIENT
Start: 2019-12-26 | End: 2021-03-29

## 2019-12-26 RX ORDER — NAPROXEN 500 MG/1
500 TABLET ORAL 2 TIMES DAILY WITH MEALS
Qty: 60 TAB | Refills: 0 | Status: SHIPPED | OUTPATIENT
Start: 2019-12-26 | End: 2021-03-29
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
## 2019-12-27 ENCOUNTER — TELEPHONE (OUTPATIENT)
Dept: MEDICAL GROUP | Facility: MEDICAL CENTER | Age: 29
End: 2019-12-27

## 2019-12-27 NOTE — TELEPHONE ENCOUNTER
----- Message from Devyn Mendoza M.D. sent at 12/26/2019  2:21 PM PST -----  X-ray shows some drying of disks at L2 and L3 which is not pathologic.  There is mild loss of disc space height at L4-5 and L5-S1.  Patient's clinical situation does not suggest nerve root pinching.  We will proceed with current treatment plan and see how back pain responds

## 2019-12-30 ENCOUNTER — PATIENT MESSAGE (OUTPATIENT)
Dept: MEDICAL GROUP | Facility: MEDICAL CENTER | Age: 29
End: 2019-12-30

## 2020-02-04 ENCOUNTER — OFFICE VISIT (OUTPATIENT)
Dept: MEDICAL GROUP | Facility: MEDICAL CENTER | Age: 30
End: 2020-02-04
Attending: FAMILY MEDICINE
Payer: MEDICAID

## 2020-02-04 VITALS
TEMPERATURE: 98 F | WEIGHT: 162 LBS | HEIGHT: 64 IN | OXYGEN SATURATION: 99 % | HEART RATE: 60 BPM | BODY MASS INDEX: 27.66 KG/M2 | RESPIRATION RATE: 16 BRPM | DIASTOLIC BLOOD PRESSURE: 68 MMHG | SYSTOLIC BLOOD PRESSURE: 110 MMHG

## 2020-02-04 DIAGNOSIS — F41.9 ANXIETY: ICD-10-CM

## 2020-02-04 PROCEDURE — 99213 OFFICE O/P EST LOW 20 MIN: CPT | Performed by: FAMILY MEDICINE

## 2020-02-04 NOTE — PROGRESS NOTES
"Subjective:      Frances Panchal is a 29 y.o. female who presents with No chief complaint on file.            HPI 1.  Anxiety-patient is noting increasing overall in feelings of anxiety and stress.  As result she resigned from her job as  at a [employer] store on 1/26/2020.  She reports that she wants to consolidate her energy spend more time with her children.  She is requesting a release to not have to do community service around her subsidized apartment complex apparently is a requirement if a person is not working.    ROS negative for dyspnea, fever, near-syncope       Objective:     /68 (BP Location: Left arm, Patient Position: Sitting, BP Cuff Size: Adult)   Pulse 60   Temp 36.7 °C (98 °F) (Temporal)   Resp 16   Ht 1.626 m (5' 4.02\")   Wt 73.5 kg (162 lb)   SpO2 99%   BMI 27.79 kg/m²      Physical Exam  Gen.- alert, cooperative, in no acute distress  Neck- midline trachea, thyroid not enlarged or tender,supple, no cervical adenopathy  Chest-clear to auscultation and percussion with normal breath sounds. No retractions. Chest wall nontender  Cardiac- regular rhythm and rate. No murmur, thrill, or heave  Psych-normal affect with good eye contact. Normal grooming. Oriented x4.            Assessment/Plan:       1. Anxiety    Plan: 1.  Patient is excused from the 8 hours of service per month community service requirement for the next 2 months-note written  2.  Follow-up as needed-discussed following a regular sleep schedule, pursuing moderate regular exercise  "

## 2020-02-04 NOTE — LETTER
February 4, 2020    Re:    Frances Panchal  [address]  [place] [address]        Dear ,    Frances is medically excused at this time from my community service requirement beginning 2/1/2020 through 4/1/2020            Sincerely,        Devyn Mendoza M.D.    Electronically Signed

## 2020-02-04 NOTE — LETTER
February 4, 2020    Re:    Frances Panchal  [address]  [place] [address]        Dear Madam Grissom at this time is medically excused from community service from 2/1/2020 through 4/1/2020            Sincerely,        Devyn Mendoza M.D.    Electronically Signed

## 2020-06-01 ENCOUNTER — PATIENT MESSAGE (OUTPATIENT)
Dept: MEDICAL GROUP | Facility: MEDICAL CENTER | Age: 30
End: 2020-06-01

## 2020-08-10 ENCOUNTER — NURSE TRIAGE (OUTPATIENT)
Dept: HEALTH INFORMATION MANAGEMENT | Facility: OTHER | Age: 30
End: 2020-08-10

## 2020-08-10 NOTE — TELEPHONE ENCOUNTER
Regarding: possible strep throat, no symptoms, no to screening questions   ----- Message from Emily Canada sent at 8/10/2020 10:38 AM PDT -----  Pt is calling for possible strep throat, no symptoms , no to screening questions, pt wants to make appt with pcp and is also calling to make appt for  for same reason .

## 2020-08-10 NOTE — TELEPHONE ENCOUNTER
1. Caller Name: Frances Panchal                 Call Back Number: [phone]  [facility] PCP or Specialty Provider: Yes      1.  In the last two weeks, has the patient had any new or worsening symptoms (not explained by alternative diagnosis)? Yes, the patient reports the following COVID-19 consistent symptoms: throat is a little sore, she thinks she has strep or tonsillitis, she thinks  may have strep throat or tonsillitis.     3.  Does patient have any comoribidities? None     4.  Has the patient traveled in the last 14 days OR had any known contact with someone who is suspected or confirmed to have COVID-19?  No.    5. Disposition: Cleared by RN Triage as potential is low for COVID-19; OK to keep/schedule appointment    Note routed to [facility] Provider: [facility] only.

## 2020-09-01 ENCOUNTER — NON-PROVIDER VISIT (OUTPATIENT)
Dept: URGENT CARE | Facility: PHYSICIAN GROUP | Age: 30
End: 2020-09-01

## 2020-09-01 DIAGNOSIS — Z02.1 PRE-EMPLOYMENT DRUG SCREENING: ICD-10-CM

## 2020-09-01 LAB
AMP AMPHETAMINE: NORMAL
COC COCAINE: NORMAL
INT CON NEG: NEGATIVE
INT CON POS: POSITIVE
MET METHAMPHETAMINES: NORMAL
OPI OPIATES: NORMAL
PCP PHENCYCLIDINE: NORMAL
POC DRUG COMMENT 753798-OCCUPATIONAL HEALTH: NEGATIVE
THC: NORMAL

## 2020-09-01 PROCEDURE — 80305 DRUG TEST PRSMV DIR OPT OBS: CPT | Performed by: NURSE PRACTITIONER

## 2020-10-29 ENCOUNTER — NURSE TRIAGE (OUTPATIENT)
Dept: HEALTH INFORMATION MANAGEMENT | Facility: OTHER | Age: 30
End: 2020-10-29

## 2020-10-29 NOTE — TELEPHONE ENCOUNTER
Regarding: SORE THROAT AND WHITE PATCHES ON BACK OF THROAT PT TRYING TO MAKE APPT WITH PCP DR YULIA GONZALEZ  ----- Message from Austin Loza sent at 10/29/2020 12:45 PM PDT -----  SORE THROAT AND WHITE PATCHES ON BACK OF THROAT PT TRYING TO MAKE APPT WITH PCP DR YULIA GONZALEZ

## 2020-10-29 NOTE — TELEPHONE ENCOUNTER
Sore throat, when she eats it really hurts throat, going up to ear, white patches on tonsils, back of tongue yellow.  Started about one month ago.      No travel.  No known coivd contact.  Not underlying health problems.      Would like to see PCP.  Cleared for in-patient visit.

## 2021-03-12 ENCOUNTER — PATIENT MESSAGE (OUTPATIENT)
Dept: MEDICAL GROUP | Facility: MEDICAL CENTER | Age: 31
End: 2021-03-12

## 2021-03-29 ENCOUNTER — OFFICE VISIT (OUTPATIENT)
Dept: MEDICAL GROUP | Facility: MEDICAL CENTER | Age: 31
End: 2021-03-29
Attending: FAMILY MEDICINE
Payer: MEDICAID

## 2021-03-29 VITALS
RESPIRATION RATE: 16 BRPM | TEMPERATURE: 98.3 F | BODY MASS INDEX: 26.46 KG/M2 | WEIGHT: 155 LBS | OXYGEN SATURATION: 97 % | DIASTOLIC BLOOD PRESSURE: 68 MMHG | HEART RATE: 60 BPM | HEIGHT: 64 IN | SYSTOLIC BLOOD PRESSURE: 104 MMHG

## 2021-03-29 DIAGNOSIS — R19.7 DIARRHEA, UNSPECIFIED TYPE: ICD-10-CM

## 2021-03-29 DIAGNOSIS — R10.84 GENERALIZED ABDOMINAL PAIN: ICD-10-CM

## 2021-03-29 PROCEDURE — 99213 OFFICE O/P EST LOW 20 MIN: CPT | Performed by: FAMILY MEDICINE

## 2021-03-29 ASSESSMENT — PATIENT HEALTH QUESTIONNAIRE - PHQ9: CLINICAL INTERPRETATION OF PHQ2 SCORE: 0

## 2021-03-29 NOTE — PROGRESS NOTES
"Subjective:      Frances Panchal is a 30 y.o. female who presents with Abdominal Pain            HPI 1.  Recurrent abdominal pain/diarrhea-patient reports her at 1 years she has had recurrent diarrhea that will occur within 20 minutes of eating practically anything.  She has tried various elimination diets and has not found a triggering food substance.  She reports that this can happen 5 or 6 times per day.  Diarrhea is yellow, nonbloody.  She did have one episode of left flank pain that awakened her from sleep and lasted about 10 to 15 minutes 3-4 nights ago that has not occurred in any other time.  She does note some urinary frequency as well but no active dysuria or hematuria.  Patient also reports quarts of some substance in her diarrhea is uncertain whether they represent any type of parasite infection    ROS negative for fever, cough, unexplained vaginal bleeding.  Positive for occasional missed menses on 1 month which will recur on time the next month.       Objective:     /68 (BP Location: Right arm, Patient Position: Sitting, BP Cuff Size: Adult)   Pulse 60   Temp 36.8 °C (98.3 °F) (Temporal)   Resp 16   Ht 1.626 m (5' 4.02\")   Wt 70.3 kg (155 lb)   SpO2 97%   BMI 26.59 kg/m²      Physical Exam  Gen.- alert, cooperative, in no acute distress  Neck- midline trachea, thyroid not enlarged or tender,supple, no cervical adenopathy  Chest-clear to auscultation and percussion with normal breath sounds. No retractions. Chest wall nontender  Cardiac- regular rhythm and rate. No murmur, thrill, or heave  Abdomen- normal bowel sounds, soft without guarding. Liver and spleen not enlarged, no palpable masses or tenderness.          Assessment/Plan:        1. Generalized abdominal pain    - Complete O&P; Future  - Comp Metabolic Panel; Future  - CBC WITH DIFFERENTIAL; Future  - Sed Rate; Future  - US-ABDOMEN COMPLETE SURVEY; Future  - URINALYSIS,CULTURE IF INDICATED; Future    2. Diarrhea, unspecified " type    - Complete O&P; Future  Plan: 1.  Stool O&P  2.  CBC, CMP, sed rate  3.  Abdominal ultrasound  4.  Schedule for repeat Pap smear-history of LSIL

## 2021-04-14 ENCOUNTER — HOSPITAL ENCOUNTER (OUTPATIENT)
Dept: RADIOLOGY | Facility: MEDICAL CENTER | Age: 31
End: 2021-04-14
Attending: FAMILY MEDICINE
Payer: MEDICAID

## 2021-04-14 DIAGNOSIS — R10.84 GENERALIZED ABDOMINAL PAIN: ICD-10-CM

## 2021-04-14 PROCEDURE — 76700 US EXAM ABDOM COMPLETE: CPT

## 2021-04-19 ENCOUNTER — TELEPHONE (OUTPATIENT)
Dept: MEDICAL GROUP | Facility: MEDICAL CENTER | Age: 31
End: 2021-04-19

## 2021-04-19 NOTE — TELEPHONE ENCOUNTER
----- Message from Devyn Mendoza M.D. sent at 4/15/2021  1:40 PM PDT -----  Ultrasound is notable for one large gallstone in the neck of the gallbladder.  The surrounding gallbladder is not thickened but I suspect it is probably a source of irritation.  Otherwise the abdominal organs visualized normally.  I do not think patient did her recent lab work which would also be helpful.  She would like to have her gallbladder removed laparoscopically we can place a referral to general surgery to have that discussion.

## 2021-04-20 ENCOUNTER — PATIENT MESSAGE (OUTPATIENT)
Dept: MEDICAL GROUP | Facility: MEDICAL CENTER | Age: 31
End: 2021-04-20

## 2021-04-22 ENCOUNTER — HOSPITAL ENCOUNTER (OUTPATIENT)
Facility: MEDICAL CENTER | Age: 31
End: 2021-04-22
Attending: FAMILY MEDICINE
Payer: MEDICAID

## 2021-04-22 ENCOUNTER — OFFICE VISIT (OUTPATIENT)
Dept: MEDICAL GROUP | Facility: MEDICAL CENTER | Age: 31
End: 2021-04-22
Attending: FAMILY MEDICINE
Payer: MEDICAID

## 2021-04-22 VITALS
OXYGEN SATURATION: 96 % | DIASTOLIC BLOOD PRESSURE: 66 MMHG | WEIGHT: 157 LBS | HEART RATE: 76 BPM | RESPIRATION RATE: 16 BRPM | HEIGHT: 64 IN | TEMPERATURE: 97.9 F | BODY MASS INDEX: 26.8 KG/M2 | SYSTOLIC BLOOD PRESSURE: 106 MMHG

## 2021-04-22 DIAGNOSIS — K80.20 CALCULUS OF GALLBLADDER WITHOUT CHOLECYSTITIS WITHOUT OBSTRUCTION: ICD-10-CM

## 2021-04-22 DIAGNOSIS — N64.4 MASTALGIA IN FEMALE: ICD-10-CM

## 2021-04-22 DIAGNOSIS — N64.4 BREAST PAIN IN FEMALE: ICD-10-CM

## 2021-04-22 DIAGNOSIS — Z12.4 SCREENING FOR MALIGNANT NEOPLASM OF CERVIX: ICD-10-CM

## 2021-04-22 PROCEDURE — 99212 OFFICE O/P EST SF 10 MIN: CPT | Mod: 25 | Performed by: FAMILY MEDICINE

## 2021-04-22 PROCEDURE — G0101 CA SCREEN;PELVIC/BREAST EXAM: HCPCS | Performed by: FAMILY MEDICINE

## 2021-04-22 PROCEDURE — 99213 OFFICE O/P EST LOW 20 MIN: CPT | Mod: 25 | Performed by: FAMILY MEDICINE

## 2021-04-22 NOTE — PROGRESS NOTES
"Subjective:      Frances Panchal is a 30 y.o. female who presents with Gynecologic Exam            HPI 1.  Cholelithiasis-recent abdominal ultrasound is notable for 2.5 cm gallstone in the neck of her gallbladder.  There is no acute gallbladder wall thickening.  Patient has recurrent abdominal discomfort possibly related to this.  She would like to discuss excision with a surgeon.  2.  Well woman exam-patient had LSIL findings on Pap smear back in 2018.  She reports notes occasional missed menses for 1 month.  Patient is using a ParaGard IUD with 1 year left.  3.  Breast pain-patient notes intermittent inferior or lateral breast pain that can occur in both breasts, dating back 5 years.  There has been no recent nipple discharge.  She is concerned over family history of breast cancer in a maternal aunt in her 40s.    ROS negative for chest pain, cough, positive for intermittent diarrhea, negative for bloody stools, dyspnea  Social history-, not working  Current medication-  Prior to Admission medications    Medication Sig Start Date End Date Taking? Authorizing Provider   meloxicam (MOBIC) 7.5 MG Tab Take 1 Tab by mouth every day. 7/12/18   Devyn Mendoza M.D.          Objective:     /66   Pulse 76   Temp 36.6 °C (97.9 °F) (Temporal)   Resp 16   Ht 1.626 m (5' 4.02\")   Wt 71.2 kg (157 lb)   SpO2 96%   BMI 26.94 kg/m²      Physical Exam  Gen.- alert, cooperative, in no acute distress  Neck- midline trachea, thyroid not enlarged or tender,supple, no cervical adenopathy  Chest-clear to auscultation and percussion with normal breath sounds. No retractions. Chest wall nontender  Cardiac- regular rhythm and rate. No murmur, thrill, or heave  -normal female external genitalia.  Vagina is clear with pink mucosa.  Cervix is visualized and Pap smear is obtained.  Bimanual exam reveals no adnexal enlargement or pelvic tenderness.   Breast-normal external contour.  There is no focal redness or " swelling or skin surface change.  Mild diffuse per sensitivity on palpation.  No nipple discharge or palpable masses       Assessment/Plan:        1. Calculus of gallbladder without cholecystitis without obstruction      2. Screening for malignant neoplasm of cervix      3. Breast pain in female      4. Mastalgia in female    Plan: 1.  Diagnostic bilateral mammogram  2.  Pap smear obtained today-distant history of LSIL  3.  General surgery referral sent  4.  Discussed limiting portions, managing weight and ongoing fasting glucose measurement once a year

## 2021-04-23 LAB
FORWARD REASON: SPWHY: NORMAL
FORWARDED TO LAB: SPWHR: NORMAL
SPECIMEN SENT: SPWT1: NORMAL

## 2021-04-26 ENCOUNTER — PATIENT MESSAGE (OUTPATIENT)
Dept: MEDICAL GROUP | Facility: MEDICAL CENTER | Age: 31
End: 2021-04-26

## 2021-04-27 ENCOUNTER — PATIENT MESSAGE (OUTPATIENT)
Dept: MEDICAL GROUP | Facility: MEDICAL CENTER | Age: 31
End: 2021-04-27

## 2021-04-27 DIAGNOSIS — N76.1 SUBACUTE VAGINITIS: ICD-10-CM

## 2021-04-28 ENCOUNTER — TELEPHONE (OUTPATIENT)
Dept: MEDICAL GROUP | Facility: MEDICAL CENTER | Age: 31
End: 2021-04-28

## 2021-04-28 NOTE — TELEPHONE ENCOUNTER
Phone Number Called: [phone] (home)       Call outcome: Spoke to patient regarding message below.    Message: pt was informed about labs results but her concerned was why was she told at first she had HPV and now is gone .

## 2021-05-19 ENCOUNTER — HOSPITAL ENCOUNTER (OUTPATIENT)
Dept: RADIOLOGY | Facility: MEDICAL CENTER | Age: 31
End: 2021-05-19
Attending: FAMILY MEDICINE
Payer: MEDICAID

## 2021-05-19 DIAGNOSIS — N64.4 MASTALGIA IN FEMALE: ICD-10-CM

## 2021-05-19 PROCEDURE — G0279 TOMOSYNTHESIS, MAMMO: HCPCS

## 2021-05-19 PROCEDURE — 76642 ULTRASOUND BREAST LIMITED: CPT | Mod: RT

## 2021-08-16 ENCOUNTER — TELEMEDICINE (OUTPATIENT)
Dept: ADMISSIONS | Facility: MEDICAL CENTER | Age: 31
End: 2021-08-16
Attending: COLON & RECTAL SURGERY
Payer: MEDICAID

## 2021-08-16 VITALS — WEIGHT: 155 LBS | HEIGHT: 64 IN | BODY MASS INDEX: 26.46 KG/M2

## 2021-08-16 RX ORDER — MV-MN/C/THEANINE/HERB NO.310 1000-200MG
1 POWDER IN PACKET (EA) ORAL DAILY
COMMUNITY
End: 2022-09-21

## 2021-08-18 ENCOUNTER — PRE-ADMISSION TESTING (OUTPATIENT)
Dept: ADMISSIONS | Facility: MEDICAL CENTER | Age: 31
End: 2021-08-18
Attending: COLON & RECTAL SURGERY
Payer: MEDICAID

## 2021-08-18 DIAGNOSIS — Z01.812 PRE-OPERATIVE LABORATORY EXAMINATION: ICD-10-CM

## 2021-08-18 LAB
ANION GAP SERPL CALC-SCNC: 13 MMOL/L (ref 7–16)
BUN SERPL-MCNC: 10 MG/DL (ref 8–22)
CALCIUM SERPL-MCNC: 9.3 MG/DL (ref 8.5–10.5)
CHLORIDE SERPL-SCNC: 105 MMOL/L (ref 96–112)
CO2 SERPL-SCNC: 23 MMOL/L (ref 20–33)
CREAT SERPL-MCNC: 0.8 MG/DL (ref 0.5–1.4)
ERYTHROCYTE [DISTWIDTH] IN BLOOD BY AUTOMATED COUNT: 43.8 FL (ref 35.9–50)
EST. AVERAGE GLUCOSE BLD GHB EST-MCNC: 100 MG/DL
GLUCOSE SERPL-MCNC: 98 MG/DL (ref 65–99)
HBA1C MFR BLD: 5.1 % (ref 4–5.6)
HCT VFR BLD AUTO: 42.4 % (ref 37–47)
HGB BLD-MCNC: 14.1 G/DL (ref 12–16)
MCH RBC QN AUTO: 31.1 PG (ref 27–33)
MCHC RBC AUTO-ENTMCNC: 33.3 G/DL (ref 33.6–35)
MCV RBC AUTO: 93.6 FL (ref 81.4–97.8)
PLATELET # BLD AUTO: 230 K/UL (ref 164–446)
PMV BLD AUTO: 10.6 FL (ref 9–12.9)
POTASSIUM SERPL-SCNC: 3.8 MMOL/L (ref 3.6–5.5)
RBC # BLD AUTO: 4.53 M/UL (ref 4.2–5.4)
SARS-COV-2 RNA RESP QL NAA+PROBE: NOTDETECTED
SODIUM SERPL-SCNC: 141 MMOL/L (ref 135–145)
SPECIMEN SOURCE: NORMAL
WBC # BLD AUTO: 8.7 K/UL (ref 4.8–10.8)

## 2021-08-18 PROCEDURE — 83036 HEMOGLOBIN GLYCOSYLATED A1C: CPT

## 2021-08-18 PROCEDURE — C9803 HOPD COVID-19 SPEC COLLECT: HCPCS

## 2021-08-18 PROCEDURE — U0005 INFEC AGEN DETEC AMPLI PROBE: HCPCS

## 2021-08-18 PROCEDURE — 85027 COMPLETE CBC AUTOMATED: CPT

## 2021-08-18 PROCEDURE — U0003 INFECTIOUS AGENT DETECTION BY NUCLEIC ACID (DNA OR RNA); SEVERE ACUTE RESPIRATORY SYNDROME CORONAVIRUS 2 (SARS-COV-2) (CORONAVIRUS DISEASE [COVID-19]), AMPLIFIED PROBE TECHNIQUE, MAKING USE OF HIGH THROUGHPUT TECHNOLOGIES AS DESCRIBED BY CMS-2020-01-R: HCPCS

## 2021-08-18 PROCEDURE — 80048 BASIC METABOLIC PNL TOTAL CA: CPT

## 2021-08-18 PROCEDURE — 36415 COLL VENOUS BLD VENIPUNCTURE: CPT

## 2021-08-20 ENCOUNTER — APPOINTMENT (OUTPATIENT)
Dept: ADMISSIONS | Facility: MEDICAL CENTER | Age: 31
End: 2021-08-20
Attending: COLON & RECTAL SURGERY
Payer: MEDICAID

## 2021-08-24 NOTE — OR NURSING
COVID-19 Pre-surgery screenin. Do you have an undiagnosed respiratory illness or symptoms such as coughing or sneezing? No  a. Onset of Sx No  b. Acute vs. chronic respiratory illness No    2. Do you have an unexplained fever greater than 100.4 degrees Fahrenheit or 38 degrees Celsius?     No    3. Have you had direct exposure to a patient who tested positive for Covid-19?    No    4. Have you had any loss of your sense of taste or smell, N/V or sore throat?  No    Patient has been informed of visitor policy and asked to wear a mask upon entering the hospital. Yes

## 2021-08-25 ENCOUNTER — ANESTHESIA EVENT (OUTPATIENT)
Dept: SURGERY | Facility: MEDICAL CENTER | Age: 31
End: 2021-08-25
Payer: MEDICAID

## 2021-08-25 ENCOUNTER — ANESTHESIA (OUTPATIENT)
Dept: SURGERY | Facility: MEDICAL CENTER | Age: 31
End: 2021-08-25
Payer: MEDICAID

## 2021-08-25 ENCOUNTER — HOSPITAL ENCOUNTER (OUTPATIENT)
Facility: MEDICAL CENTER | Age: 31
End: 2021-08-25
Attending: COLON & RECTAL SURGERY | Admitting: COLON & RECTAL SURGERY
Payer: MEDICAID

## 2021-08-25 VITALS
RESPIRATION RATE: 14 BRPM | OXYGEN SATURATION: 95 % | WEIGHT: 154.1 LBS | TEMPERATURE: 97.7 F | SYSTOLIC BLOOD PRESSURE: 158 MMHG | BODY MASS INDEX: 26.31 KG/M2 | HEART RATE: 70 BPM | DIASTOLIC BLOOD PRESSURE: 79 MMHG | HEIGHT: 64 IN

## 2021-08-25 DIAGNOSIS — G89.18 POSTOPERATIVE PAIN: ICD-10-CM

## 2021-08-25 LAB
GLUCOSE BLD-MCNC: 110 MG/DL (ref 65–99)
HCG UR QL: NEGATIVE
PATHOLOGY CONSULT NOTE: NORMAL

## 2021-08-25 PROCEDURE — 160046 HCHG PACU - 1ST 60 MINS PHASE II: Performed by: COLON & RECTAL SURGERY

## 2021-08-25 PROCEDURE — 501399 HCHG SPECIMAN BAG, ENDO CATC: Performed by: COLON & RECTAL SURGERY

## 2021-08-25 PROCEDURE — 700111 HCHG RX REV CODE 636 W/ 250 OVERRIDE (IP): Performed by: ANESTHESIOLOGY

## 2021-08-25 PROCEDURE — 502571 HCHG PACK, LAP CHOLE: Performed by: COLON & RECTAL SURGERY

## 2021-08-25 PROCEDURE — 700105 HCHG RX REV CODE 258: Performed by: COLON & RECTAL SURGERY

## 2021-08-25 PROCEDURE — 700101 HCHG RX REV CODE 250: Performed by: ANESTHESIOLOGY

## 2021-08-25 PROCEDURE — 501570 HCHG TROCAR, SEPARATOR: Performed by: COLON & RECTAL SURGERY

## 2021-08-25 PROCEDURE — 160025 RECOVERY II MINUTES (STATS): Performed by: COLON & RECTAL SURGERY

## 2021-08-25 PROCEDURE — 160029 HCHG SURGERY MINUTES - 1ST 30 MINS LEVEL 4: Performed by: COLON & RECTAL SURGERY

## 2021-08-25 PROCEDURE — 501497 HCHG SURGICLIP: Performed by: COLON & RECTAL SURGERY

## 2021-08-25 PROCEDURE — 88304 TISSUE EXAM BY PATHOLOGIST: CPT

## 2021-08-25 PROCEDURE — 81025 URINE PREGNANCY TEST: CPT

## 2021-08-25 PROCEDURE — 160009 HCHG ANES TIME/MIN: Performed by: COLON & RECTAL SURGERY

## 2021-08-25 PROCEDURE — 160002 HCHG RECOVERY MINUTES (STAT): Performed by: COLON & RECTAL SURGERY

## 2021-08-25 PROCEDURE — 160041 HCHG SURGERY MINUTES - EA ADDL 1 MIN LEVEL 4: Performed by: COLON & RECTAL SURGERY

## 2021-08-25 PROCEDURE — 160036 HCHG PACU - EA ADDL 30 MINS PHASE I: Performed by: COLON & RECTAL SURGERY

## 2021-08-25 PROCEDURE — 160048 HCHG OR STATISTICAL LEVEL 1-5: Performed by: COLON & RECTAL SURGERY

## 2021-08-25 PROCEDURE — 501838 HCHG SUTURE GENERAL: Performed by: COLON & RECTAL SURGERY

## 2021-08-25 PROCEDURE — 501583 HCHG TROCAR, THRD CAN&SEAL 5X100: Performed by: COLON & RECTAL SURGERY

## 2021-08-25 PROCEDURE — 700102 HCHG RX REV CODE 250 W/ 637 OVERRIDE(OP): Performed by: ANESTHESIOLOGY

## 2021-08-25 PROCEDURE — 160035 HCHG PACU - 1ST 60 MINS PHASE I: Performed by: COLON & RECTAL SURGERY

## 2021-08-25 PROCEDURE — 82962 GLUCOSE BLOOD TEST: CPT

## 2021-08-25 PROCEDURE — 700101 HCHG RX REV CODE 250: Performed by: COLON & RECTAL SURGERY

## 2021-08-25 PROCEDURE — A9270 NON-COVERED ITEM OR SERVICE: HCPCS | Performed by: ANESTHESIOLOGY

## 2021-08-25 PROCEDURE — 501571 HCHG TROCAR, SEPARATOR 12X100: Performed by: COLON & RECTAL SURGERY

## 2021-08-25 PROCEDURE — 501338 HCHG SHEARS, ENDO: Performed by: COLON & RECTAL SURGERY

## 2021-08-25 RX ORDER — DIPHENHYDRAMINE HYDROCHLORIDE 50 MG/ML
12.5 INJECTION INTRAMUSCULAR; INTRAVENOUS
Status: DISCONTINUED | OUTPATIENT
Start: 2021-08-25 | End: 2021-08-25 | Stop reason: HOSPADM

## 2021-08-25 RX ORDER — DEXAMETHASONE SODIUM PHOSPHATE 4 MG/ML
INJECTION, SOLUTION INTRA-ARTICULAR; INTRALESIONAL; INTRAMUSCULAR; INTRAVENOUS; SOFT TISSUE PRN
Status: DISCONTINUED | OUTPATIENT
Start: 2021-08-25 | End: 2021-08-25 | Stop reason: HOSPADM

## 2021-08-25 RX ORDER — HYDROMORPHONE HYDROCHLORIDE 1 MG/ML
0.1 INJECTION, SOLUTION INTRAMUSCULAR; INTRAVENOUS; SUBCUTANEOUS
Status: DISCONTINUED | OUTPATIENT
Start: 2021-08-25 | End: 2021-08-25 | Stop reason: HOSPADM

## 2021-08-25 RX ORDER — BUPIVACAINE HYDROCHLORIDE AND EPINEPHRINE 5; 5 MG/ML; UG/ML
INJECTION, SOLUTION EPIDURAL; INTRACAUDAL; PERINEURAL
Status: DISCONTINUED | OUTPATIENT
Start: 2021-08-25 | End: 2021-08-25 | Stop reason: HOSPADM

## 2021-08-25 RX ORDER — HYDROMORPHONE HYDROCHLORIDE 1 MG/ML
0.2 INJECTION, SOLUTION INTRAMUSCULAR; INTRAVENOUS; SUBCUTANEOUS
Status: DISCONTINUED | OUTPATIENT
Start: 2021-08-25 | End: 2021-08-25 | Stop reason: HOSPADM

## 2021-08-25 RX ORDER — HYDROCODONE BITARTRATE AND ACETAMINOPHEN 5; 325 MG/1; MG/1
1 TABLET ORAL EVERY 4 HOURS PRN
Qty: 18 TABLET | Refills: 0 | Status: SHIPPED | OUTPATIENT
Start: 2021-08-25 | End: 2021-08-28

## 2021-08-25 RX ORDER — HYDROMORPHONE HYDROCHLORIDE 1 MG/ML
0.4 INJECTION, SOLUTION INTRAMUSCULAR; INTRAVENOUS; SUBCUTANEOUS
Status: DISCONTINUED | OUTPATIENT
Start: 2021-08-25 | End: 2021-08-25 | Stop reason: HOSPADM

## 2021-08-25 RX ORDER — MIDAZOLAM HYDROCHLORIDE 1 MG/ML
INJECTION INTRAMUSCULAR; INTRAVENOUS PRN
Status: DISCONTINUED | OUTPATIENT
Start: 2021-08-25 | End: 2021-08-25 | Stop reason: SURG

## 2021-08-25 RX ORDER — CEFOTETAN DISODIUM 2 G/20ML
INJECTION, POWDER, FOR SOLUTION INTRAMUSCULAR; INTRAVENOUS PRN
Status: DISCONTINUED | OUTPATIENT
Start: 2021-08-25 | End: 2021-08-25 | Stop reason: SURG

## 2021-08-25 RX ORDER — ONDANSETRON 2 MG/ML
INJECTION INTRAMUSCULAR; INTRAVENOUS PRN
Status: DISCONTINUED | OUTPATIENT
Start: 2021-08-25 | End: 2021-08-25 | Stop reason: SURG

## 2021-08-25 RX ORDER — ONDANSETRON 2 MG/ML
4 INJECTION INTRAMUSCULAR; INTRAVENOUS
Status: COMPLETED | OUTPATIENT
Start: 2021-08-25 | End: 2021-08-25

## 2021-08-25 RX ORDER — LIDOCAINE HYDROCHLORIDE 20 MG/ML
INJECTION, SOLUTION EPIDURAL; INFILTRATION; INTRACAUDAL; PERINEURAL PRN
Status: DISCONTINUED | OUTPATIENT
Start: 2021-08-25 | End: 2021-08-25 | Stop reason: SURG

## 2021-08-25 RX ORDER — OXYCODONE HCL 5 MG/5 ML
10 SOLUTION, ORAL ORAL
Status: COMPLETED | OUTPATIENT
Start: 2021-08-25 | End: 2021-08-25

## 2021-08-25 RX ORDER — SODIUM CHLORIDE, SODIUM LACTATE, POTASSIUM CHLORIDE, CALCIUM CHLORIDE 600; 310; 30; 20 MG/100ML; MG/100ML; MG/100ML; MG/100ML
INJECTION, SOLUTION INTRAVENOUS CONTINUOUS
Status: ACTIVE | OUTPATIENT
Start: 2021-08-25 | End: 2021-08-25

## 2021-08-25 RX ORDER — HALOPERIDOL 5 MG/ML
1 INJECTION INTRAMUSCULAR
Status: DISCONTINUED | OUTPATIENT
Start: 2021-08-25 | End: 2021-08-25 | Stop reason: HOSPADM

## 2021-08-25 RX ORDER — MEPERIDINE HYDROCHLORIDE 25 MG/ML
6.25 INJECTION INTRAMUSCULAR; INTRAVENOUS; SUBCUTANEOUS
Status: DISCONTINUED | OUTPATIENT
Start: 2021-08-25 | End: 2021-08-25 | Stop reason: HOSPADM

## 2021-08-25 RX ORDER — ONDANSETRON 4 MG/1
4 TABLET, ORALLY DISINTEGRATING ORAL EVERY 6 HOURS PRN
Qty: 10 TABLET | Refills: 0 | Status: SHIPPED | OUTPATIENT
Start: 2021-08-25 | End: 2022-09-21

## 2021-08-25 RX ORDER — ROCURONIUM BROMIDE 10 MG/ML
INJECTION, SOLUTION INTRAVENOUS PRN
Status: DISCONTINUED | OUTPATIENT
Start: 2021-08-25 | End: 2021-08-25 | Stop reason: SURG

## 2021-08-25 RX ORDER — OXYCODONE HCL 5 MG/5 ML
5 SOLUTION, ORAL ORAL
Status: COMPLETED | OUTPATIENT
Start: 2021-08-25 | End: 2021-08-25

## 2021-08-25 RX ORDER — OXYCODONE HCL 10 MG/1
10 TABLET, FILM COATED, EXTENDED RELEASE ORAL ONCE
Status: COMPLETED | OUTPATIENT
Start: 2021-08-25 | End: 2021-08-25

## 2021-08-25 RX ORDER — ACETAMINOPHEN 500 MG
1000 TABLET ORAL ONCE
Status: COMPLETED | OUTPATIENT
Start: 2021-08-25 | End: 2021-08-25

## 2021-08-25 RX ADMIN — CEFOTETAN DISODIUM 2 G: 2 INJECTION, POWDER, FOR SOLUTION INTRAMUSCULAR; INTRAVENOUS at 10:58

## 2021-08-25 RX ADMIN — FENTANYL CITRATE 50 MCG: 50 INJECTION, SOLUTION INTRAMUSCULAR; INTRAVENOUS at 11:41

## 2021-08-25 RX ADMIN — ROCURONIUM BROMIDE 30 MG: 10 INJECTION, SOLUTION INTRAVENOUS at 10:52

## 2021-08-25 RX ADMIN — FENTANYL CITRATE 100 MCG: 50 INJECTION, SOLUTION INTRAMUSCULAR; INTRAVENOUS at 11:02

## 2021-08-25 RX ADMIN — SODIUM CHLORIDE, POTASSIUM CHLORIDE, SODIUM LACTATE AND CALCIUM CHLORIDE: 600; 310; 30; 20 INJECTION, SOLUTION INTRAVENOUS at 10:48

## 2021-08-25 RX ADMIN — OXYCODONE HYDROCHLORIDE 5 MG: 5 SOLUTION ORAL at 12:06

## 2021-08-25 RX ADMIN — FENTANYL CITRATE 50 MCG: 50 INJECTION, SOLUTION INTRAMUSCULAR; INTRAVENOUS at 12:05

## 2021-08-25 RX ADMIN — LIDOCAINE HYDROCHLORIDE 20 MG: 20 INJECTION, SOLUTION EPIDURAL; INFILTRATION; INTRACAUDAL at 10:52

## 2021-08-25 RX ADMIN — FENTANYL CITRATE 100 MCG: 50 INJECTION, SOLUTION INTRAMUSCULAR; INTRAVENOUS at 10:52

## 2021-08-25 RX ADMIN — ONDANSETRON 4 MG: 2 INJECTION INTRAMUSCULAR; INTRAVENOUS at 13:14

## 2021-08-25 RX ADMIN — ACETAMINOPHEN 1000 MG: 500 TABLET ORAL at 09:02

## 2021-08-25 RX ADMIN — MIDAZOLAM HYDROCHLORIDE 2 MG: 1 INJECTION, SOLUTION INTRAMUSCULAR; INTRAVENOUS at 10:48

## 2021-08-25 RX ADMIN — DEXAMETHASONE SODIUM PHOSPHATE 4 MG: 4 INJECTION, SOLUTION INTRA-ARTICULAR; INTRALESIONAL; INTRAMUSCULAR; INTRAVENOUS; SOFT TISSUE at 10:56

## 2021-08-25 RX ADMIN — OXYCODONE HYDROCHLORIDE 10 MG: 10 TABLET, FILM COATED, EXTENDED RELEASE ORAL at 09:02

## 2021-08-25 RX ADMIN — ONDANSETRON 4 MG: 2 INJECTION INTRAMUSCULAR; INTRAVENOUS at 11:11

## 2021-08-25 RX ADMIN — PROPOFOL 150 MG: 10 INJECTION, EMULSION INTRAVENOUS at 10:52

## 2021-08-25 ASSESSMENT — PAIN DESCRIPTION - PAIN TYPE
TYPE: SURGICAL PAIN

## 2021-08-25 ASSESSMENT — PAIN SCALES - GENERAL: PAIN_LEVEL: 5

## 2021-08-25 NOTE — DISCHARGE INSTRUCTIONS
ACTIVITY: Rest and take it easy for the first 24 hours.  A responsible adult is recommended to remain with you during that time.  It is normal to feel sleepy.  We encourage you to not do anything that requires balance, judgment or coordination.    MILD FLU-LIKE SYMPTOMS ARE NORMAL. YOU MAY EXPERIENCE GENERALIZED MUSCLE ACHES, THROAT IRRITATION, HEADACHE AND/OR SOME NAUSEA.    FOR 24 HOURS DO NOT:  Drive, operate machinery or run household appliances.  Drink beer or alcoholic beverages.   Make important decisions or sign legal documents.    SPECIAL INSTRUCTIONS:     1. DIET: Upon discharge from the hospital you may resume your normal preoperative diet. Depending on how you are feeling and whether you have nausea or not, you may wish to stay with a bland diet for the first few days. However, you can advance this as quickly as you feel ready.     2. ACTIVITIES: After discharge from the hospital, you may resume full routine activities. However, there should be no heavy lifting (greater than 15 pounds) and no strenuous activities until after your follow-up visit. Otherwise, routine activities of daily living are acceptable.     3. DRIVING: You may drive whenever you are off pain medications and are able to perform the activities needed to drive, i.e. turning, bending, twisting, etc.     4. BATHING: You may get the wound wet 2 days after surgery. You may shower, but do not submerge in a bath for at least a week. Dressings may come off after 48 hours. You have steri-strips under your dressings. These steri-strips should stay in place. They will fall off over 5-7 days.     5. BOWEL FUNCTION: Constipation is common after an operation, especially with pain medications. The combination of pain medication and decreased activity level can cause constipation in otherwise normal patients. If you feel this is occurring, take a laxative (Miralax, Milk of Magnesia, Ex-Lax, Senokot, etc.) until the problem has resolved.     6. PAIN  MEDICATION: You will be given a prescription for pain medication at discharge. Please take these as directed. It is important to remember not to take medications on an empty stomach as this may cause nausea.     7.CALL IF YOU HAVE: (1) Fevers to more than 101.0 F, (2) Unusual chest or leg pain, (3) Drainage or fluid from incision that may be foul smelling, increased tenderness or soreness at the wound or the wound edges are no longer together, redness or swelling at the incision site. Please do not hesitate to call with any other questions.     8. APPOINTMENT: Contact our office at [phone] for a follow-up appointment in 1-2 weeks following your procedure.     If you have any additional questions, please do not hesitate to call the office and speak to either myself or the physician on call.     Office address:   [facility].   [address] [place] [id number]    DIET: To avoid nausea, slowly advance diet as tolerated, avoiding spicy or greasy foods for the first day.  Add more substantial food to your diet according to your physician's instructions.  Babies can be fed formula or breast milk as soon as they are hungry.  INCREASE FLUIDS AND FIBER TO AVOID CONSTIPATION.    FOLLOW-UP APPOINTMENT:  A follow-up appointment should be arranged with your doctor in *1-2 weeks*; call to schedule.    You should CALL YOUR PHYSICIAN if you develop:  Fever greater than 101 degrees F.  Pain not relieved by medication, or persistent nausea or vomiting.  Excessive bleeding (blood soaking through dressing) or unexpected drainage from the wound.  Extreme redness or swelling around the incision site, drainage of pus or foul smelling drainage.  Inability to urinate or empty your bladder within 8 hours.  Problems with breathing or chest pain.    You should call 911 if you develop problems with breathing or chest pain.  If you are unable to contact your doctor or surgical center, you should go to the  nearest emergency room or urgent care center.  Physician's telephone #: Dr. Agee [phone]    If any questions arise, call your doctor.  If your doctor is not available, please feel free to call the Surgical Center at [phone]. The Contact Center is open Monday through Friday 7AM to 5PM and may speak to a nurse at [phone], or toll free at [phone].     A registered nurse may call you a few days after your surgery to see how you are doing after your procedure.    MEDICATIONS: Resume taking daily medication.  Take prescribed pain medication with food.  If no medication is prescribed, you may take non-aspirin pain medication if needed.  PAIN MEDICATION CAN BE VERY CONSTIPATING.  Take a stool softener or laxative such as senokot, pericolace, or milk of magnesia if needed.    Prescription given for *Norco and Zofran - sent to your pharmacy*.  Last pain medication given at *12:06pm*.    If your physician has prescribed pain medication that includes Acetaminophen (Tylenol), do not take additional Acetaminophen (Tylenol) while taking the prescribed medication.    Depression / Suicide Risk    As you are discharged from this [facility] facility, it is important to learn how to keep safe from harming yourself.    Recognize the warning signs:  · Abrupt changes in personality, positive or negative- including increase in energy   · Giving away possessions  · Change in eating patterns- significant weight changes-  positive or negative  · Change in sleeping patterns- unable to sleep or sleeping all the time   · Unwillingness or inability to communicate  · Depression  · Unusual sadness, discouragement and loneliness  · Talk of wanting to die  · Neglect of personal appearance   · Rebelliousness- reckless behavior  · Withdrawal from people/activities they love  · Confusion- inability to concentrate     If you or a loved one observes any of these behaviors or has concerns about self-harm, here's what you can  do:  · Talk about it- your feelings and reasons for harming yourself  · Remove any means that you might use to hurt yourself (examples: pills, rope, extension cords, firearm)  · Get professional help from the community (Mental Health, Substance Abuse, psychological counseling)  · Do not be alone:Call your Safe Contact- someone whom you trust who will be there for you.  · Call your local CRISIS HOTLINE 346-7788 or 303-918-3216  · Call your local Children's Mobile Crisis Response Team [place] (223) 065-7922 or www.[place]  · Call the toll free National Suicide Prevention Hotlines   · National Suicide Prevention Lifeline 766-710-DHBC (7727)  · National Hope Line Network 800-SUICIDE (716-5280)

## 2021-08-25 NOTE — ANESTHESIA POSTPROCEDURE EVALUATION
Patient: Frances Mario Abdulkadir    Procedure Summary     Date: 08/25/21 Room / Location: [place] [address] / SURGERY [facility]    Anesthesia Start: 1048 Anesthesia Stop: 1135    Procedure: CHOLECYSTECTOMY, LAPAROSCOPIC. (Abdomen) Diagnosis: (CHOLELITHIASIS)    Surgeons: Sanchez Aege M.D. Responsible Provider: Leslie Rice M.D.    Anesthesia Type: general ASA Status: 2          Final Anesthesia Type: general  Last vitals  BP   Blood Pressure: 114/79    Temp   36.7 °C (98 °F)    Pulse   (Abnormal) 56   Resp   13    SpO2   95 %      Anesthesia Post Evaluation    Patient location during evaluation: PACU  Patient participation: complete - patient participated  Level of consciousness: awake and alert  Pain score: 5    Airway patency: patent  Anesthetic complications: no  Cardiovascular status: hemodynamically stable  Respiratory status: acceptable  Hydration status: euvolemic    PONV: none          No complications documented.     Nurse Pain Score: 7 (NPRS)

## 2021-08-25 NOTE — ANESTHESIA PREPROCEDURE EVALUATION
Relevant Problems   No relevant active problems       Physical Exam    Airway   Mallampati: II  TM distance: >3 FB  Neck ROM: full       Cardiovascular - normal exam  Rhythm: regular  Rate: normal  (-) murmur     Dental - normal exam           Pulmonary - normal exam  Breath sounds clear to auscultation     Abdominal    Neurological - normal exam         Other findings:             Anesthesia Plan    ASA 2       Plan - general       Airway plan will be ETT        Plan Factors:   Patient was previously instructed to abstain from smoking on day of procedure.  Patient did not smoke on day of procedure.      Induction: intravenous    Postoperative Plan: Postoperative administration of opioids is intended.    Pertinent diagnostic labs and testing reviewed    Informed Consent:      Use of blood products discussed with: patient whom consented to blood products.

## 2021-08-25 NOTE — OP REPORT
NAME:  Frances Panchal  MRN:  [id number]  :  1990      DATE OF OPERATION: 2021    PREOPERATIVE DIAGNOSIS: Biliary colic    POSTOPERATIVE DIAGNOSIS: Cholecystitis with hydrops    OPERATION PERFORMED: Laparoscopic cholecystectomy    SURGEON: Sanchez Agee MD    ASSISTANT:  Norma Gruber PA-C, PA-C    ANESTHESIOLOGIST:  Anesthesiologist: Leslie Rice M.D.    ANESTHESIA: General endotracheal anesthesia.     FINDINGS: The gallbladder showed signs of hydrops and cholecystitis, stone impacted in neck of gallbladder.     SPECIMEN: Gallbladder.    ESTIMATED BLOOD LOSS: <10 cc.     INDICATIONS: The patient is a 30 y.o. female with a history of symptomatic cholelithiasis. She is taken to the operating room today for laparoscopic cholecystectomy.     PROCEDURE: Following informed consent, the patient was properly identified, taken to the operating room, and placed in the supine position where general endotracheal anesthesia was administered. Intravenous antibiotics were administered by the anesthesiologist in the correct time interval. Sequential compression devices were employed. The abdomen was prepped and draped into a sterile field.     A bladeless optical entry trocar was carefully inserted into the abdomen and a pneumoperitoneum was established in the usual fashion.  A 5 mm separator port was introduced. A 5 mm lens/camera was passed into the peritoneal cavity.  An 11 mm port was placed in the epigastric midline under direct vision. Two 5 mm right subcostal ports were placed under direct vision.     Careful examination of the distended gallbladder and liver were performed.  60cc of clear bile were aspirated. Adhesions to the lower half of the gallbladder were lysed carefully. The gallbladder was then grasped and elevated upward toward the right shoulder.  Dissection was carried out in hepatocystic triangle definitively identifying the cystic duct and cystic   artery. These structures were multiply  clipped proximally, once distally and   divided. The gallbladder was dissected free from the undersurface of the   liver using electrocautery and placed within an EndoCatch bag. It was delivered intact from the abdominal cavity through the epigastric port site. The gallbladder fossa was irrigated. All excess irrigant was evacuated from the abdominal cavity. Hemostasis was satisfactory.     The ports were removed and the abdomen desufflated. The enlarged epigastric port site fascia was approximated with a 0 Vicryl suture. The port site skin incisions were closed with interrupted 4-0 Vicryl subcuticular sutures.  Steri-Strips and Benzoin were applied beneath sterile Band-Aids.     The patient tolerated the procedure well and there were no apparent complications. All sponge, needle, and instrument counts were correct on 2 separate occasions. She was awakened, extubated, and transferred to the recovery room in satisfactory condition.       ____________________________________   Sanchez Agee MD  DD: 8/25/2021  11:34 AM    CC:  [facility];

## 2021-08-25 NOTE — ANESTHESIA PROCEDURE NOTES
Airway    Date/Time: 8/25/2021 10:55 AM  Performed by: Leslie Rice M.D.  Authorized by: Leslie Rice M.D.     Location:  OR  Urgency:  Elective  Indications for Airway Management:  Anesthesia      Spontaneous Ventilation: absent    Sedation Level:  Deep  Preoxygenated: Yes    Patient Position:  Sniffing  Final Airway Type:  Endotracheal airway  Final Endotracheal Airway:  ETT  Cuffed: Yes    Technique Used for Successful ETT Placement:  Direct laryngoscopy    Insertion Site:  Oral  Blade Type:  Tiffany  Laryngoscope Blade/Videolaryngoscope Blade Size:  3  ETT Size (mm):  7.0  Measured from:  Teeth  ETT to Teeth (cm):  20  Placement Verified by: auscultation and capnometry    Cormack-Lehane Classification:  Grade I - full view of glottis  Number of Attempts at Approach:  1

## 2021-08-25 NOTE — OR NURSING
1310 - pt had episode of emesis; medication given, see MAR.    1315 - discharge instructions reviewed with pt and pt's  by Tanisha KNUTSON.    1325 - Pt states she is ready for discharge. Pt getting dressed with assist of RN.      1332 - pt taken to car via wheelchair by CNA. No further needs.

## 2021-08-25 NOTE — ANESTHESIA TIME REPORT
Anesthesia Start and Stop Event Times     Date Time Event    8/25/2021 10:41 AM Ready for Procedure    8/25/2021 10:48 AM Anesthesia Start        Responsible Staff  08/25/21    Name Role Begin End    Leslie Rice M.D. Anesthesiologist 08/25/21 10:48 AM Not recorded        Preop Diagnosis (Free Text):  Pre-op Diagnosis     CHOLELITHIASIS        Preop Diagnosis (Codes):    Post op Diagnosis  Cholelithiasis      Premium Reason  Non-Premium    Comments:

## 2021-11-12 ENCOUNTER — APPOINTMENT (OUTPATIENT)
Dept: RADIOLOGY | Facility: MEDICAL CENTER | Age: 31
End: 2021-11-12
Attending: EMERGENCY MEDICINE
Payer: MEDICAID

## 2021-11-12 ENCOUNTER — HOSPITAL ENCOUNTER (EMERGENCY)
Facility: MEDICAL CENTER | Age: 31
End: 2021-11-12
Attending: EMERGENCY MEDICINE
Payer: MEDICAID

## 2021-11-12 VITALS
SYSTOLIC BLOOD PRESSURE: 122 MMHG | HEART RATE: 70 BPM | TEMPERATURE: 97.3 F | DIASTOLIC BLOOD PRESSURE: 68 MMHG | OXYGEN SATURATION: 94 % | WEIGHT: 154.32 LBS | RESPIRATION RATE: 16 BRPM | BODY MASS INDEX: 26.35 KG/M2 | HEIGHT: 64 IN

## 2021-11-12 DIAGNOSIS — R10.12 LUQ ABDOMINAL PAIN: ICD-10-CM

## 2021-11-12 LAB
ALBUMIN SERPL BCP-MCNC: 5.2 G/DL (ref 3.2–4.9)
ALBUMIN/GLOB SERPL: 2.4 G/DL
ALP SERPL-CCNC: 49 U/L (ref 30–99)
ALT SERPL-CCNC: 18 U/L (ref 2–50)
ANION GAP SERPL CALC-SCNC: 13 MMOL/L (ref 7–16)
APPEARANCE UR: CLEAR
AST SERPL-CCNC: 20 U/L (ref 12–45)
BACTERIA #/AREA URNS HPF: ABNORMAL /HPF
BASOPHILS # BLD AUTO: 0.3 % (ref 0–1.8)
BASOPHILS # BLD: 0.02 K/UL (ref 0–0.12)
BILIRUB SERPL-MCNC: 0.5 MG/DL (ref 0.1–1.5)
BILIRUB UR QL STRIP.AUTO: NEGATIVE
BUN SERPL-MCNC: 9 MG/DL (ref 8–22)
CALCIUM SERPL-MCNC: 9.4 MG/DL (ref 8.5–10.5)
CHLORIDE SERPL-SCNC: 104 MMOL/L (ref 96–112)
CO2 SERPL-SCNC: 24 MMOL/L (ref 20–33)
COLOR UR: YELLOW
CREAT SERPL-MCNC: 0.64 MG/DL (ref 0.5–1.4)
EOSINOPHIL # BLD AUTO: 0.02 K/UL (ref 0–0.51)
EOSINOPHIL NFR BLD: 0.3 % (ref 0–6.9)
EPI CELLS #/AREA URNS HPF: ABNORMAL /HPF
ERYTHROCYTE [DISTWIDTH] IN BLOOD BY AUTOMATED COUNT: 40.3 FL (ref 35.9–50)
GLOBULIN SER CALC-MCNC: 2.2 G/DL (ref 1.9–3.5)
GLUCOSE SERPL-MCNC: 104 MG/DL (ref 65–99)
GLUCOSE UR STRIP.AUTO-MCNC: NEGATIVE MG/DL
HCG SERPL QL: NEGATIVE
HCT VFR BLD AUTO: 40.5 % (ref 37–47)
HGB BLD-MCNC: 14.1 G/DL (ref 12–16)
HYALINE CASTS #/AREA URNS LPF: ABNORMAL /LPF
IMM GRANULOCYTES # BLD AUTO: 0.04 K/UL (ref 0–0.11)
IMM GRANULOCYTES NFR BLD AUTO: 0.6 % (ref 0–0.9)
KETONES UR STRIP.AUTO-MCNC: 15 MG/DL
LEUKOCYTE ESTERASE UR QL STRIP.AUTO: ABNORMAL
LIPASE SERPL-CCNC: 18 U/L (ref 11–82)
LYMPHOCYTES # BLD AUTO: 1.62 K/UL (ref 1–4.8)
LYMPHOCYTES NFR BLD: 23.6 % (ref 22–41)
MCH RBC QN AUTO: 30.5 PG (ref 27–33)
MCHC RBC AUTO-ENTMCNC: 34.8 G/DL (ref 33.6–35)
MCV RBC AUTO: 87.7 FL (ref 81.4–97.8)
MICRO URNS: ABNORMAL
MONOCYTES # BLD AUTO: 0.52 K/UL (ref 0–0.85)
MONOCYTES NFR BLD AUTO: 7.6 % (ref 0–13.4)
MUCOUS THREADS #/AREA URNS HPF: ABNORMAL /HPF
NEUTROPHILS # BLD AUTO: 4.63 K/UL (ref 2–7.15)
NEUTROPHILS NFR BLD: 67.6 % (ref 44–72)
NITRITE UR QL STRIP.AUTO: NEGATIVE
NRBC # BLD AUTO: 0 K/UL
NRBC BLD-RTO: 0 /100 WBC
PH UR STRIP.AUTO: 5.5 [PH] (ref 5–8)
PLATELET # BLD AUTO: 200 K/UL (ref 164–446)
PMV BLD AUTO: 10 FL (ref 9–12.9)
POTASSIUM SERPL-SCNC: 3.3 MMOL/L (ref 3.6–5.5)
PROT SERPL-MCNC: 7.4 G/DL (ref 6–8.2)
PROT UR QL STRIP: NEGATIVE MG/DL
RBC # BLD AUTO: 4.62 M/UL (ref 4.2–5.4)
RBC # URNS HPF: ABNORMAL /HPF
RBC UR QL AUTO: ABNORMAL
SODIUM SERPL-SCNC: 141 MMOL/L (ref 135–145)
SP GR UR STRIP.AUTO: 1.03
UROBILINOGEN UR STRIP.AUTO-MCNC: 0.2 MG/DL
WBC # BLD AUTO: 6.9 K/UL (ref 4.8–10.8)
WBC #/AREA URNS HPF: ABNORMAL /HPF

## 2021-11-12 PROCEDURE — 85025 COMPLETE CBC W/AUTO DIFF WBC: CPT

## 2021-11-12 PROCEDURE — 80053 COMPREHEN METABOLIC PANEL: CPT

## 2021-11-12 PROCEDURE — 700117 HCHG RX CONTRAST REV CODE 255: Performed by: EMERGENCY MEDICINE

## 2021-11-12 PROCEDURE — 81001 URINALYSIS AUTO W/SCOPE: CPT

## 2021-11-12 PROCEDURE — 83690 ASSAY OF LIPASE: CPT

## 2021-11-12 PROCEDURE — 74177 CT ABD & PELVIS W/CONTRAST: CPT

## 2021-11-12 PROCEDURE — 36415 COLL VENOUS BLD VENIPUNCTURE: CPT

## 2021-11-12 PROCEDURE — 96374 THER/PROPH/DIAG INJ IV PUSH: CPT | Mod: XU

## 2021-11-12 PROCEDURE — 700111 HCHG RX REV CODE 636 W/ 250 OVERRIDE (IP): Performed by: EMERGENCY MEDICINE

## 2021-11-12 PROCEDURE — 84703 CHORIONIC GONADOTROPIN ASSAY: CPT

## 2021-11-12 PROCEDURE — 99284 EMERGENCY DEPT VISIT MOD MDM: CPT

## 2021-11-12 RX ORDER — KETOROLAC TROMETHAMINE 30 MG/ML
15 INJECTION, SOLUTION INTRAMUSCULAR; INTRAVENOUS ONCE
Status: COMPLETED | OUTPATIENT
Start: 2021-11-12 | End: 2021-11-12

## 2021-11-12 RX ORDER — OMEPRAZOLE 40 MG/1
40 CAPSULE, DELAYED RELEASE ORAL DAILY
Qty: 30 CAPSULE | Refills: 0 | Status: SHIPPED | OUTPATIENT
Start: 2021-11-12 | End: 2022-09-21

## 2021-11-12 RX ADMIN — KETOROLAC TROMETHAMINE 15 MG: 30 INJECTION, SOLUTION INTRAMUSCULAR; INTRAVENOUS at 10:08

## 2021-11-12 RX ADMIN — IOHEXOL 100 ML: 350 INJECTION, SOLUTION INTRAVENOUS at 11:38

## 2021-11-12 ASSESSMENT — PAIN DESCRIPTION - PAIN TYPE: TYPE: ACUTE PAIN

## 2021-11-12 NOTE — ED NOTES
Pts  requesting eta of ct and supervisor.  aware of poc. Offered to have other nurse come in to medicate pt, pt declining once more. No further needs at this time.

## 2021-11-12 NOTE — ED NOTES
"Pt ambulated to YE 56.  Agree with triage note.  PT reports chest pressure \"like an anxiety feeling\" when trying to have a BM.    "

## 2021-11-12 NOTE — DISCHARGE INSTRUCTIONS
Your test today including CT scan and lab work showed no dangerous cause for your pain.  This is likely related to your stomach.  Would advise taking the prescription as above and ensuring a mild diet.  Please ensure you follow-up with your primary care doctor and potentially GI doctor as above.  Seek more immediate medical attention for any new or worsening pain, persistent vomiting, high fevers or other concerns

## 2021-11-12 NOTE — ED PROVIDER NOTES
ED Provider Note    ER PROVIDER NOTE      CHIEF COMPLAINT  Chief Complaint   Patient presents with   • LUQ Pain     x1d   • Diarrhea       HPI  Frances Panchal is a 30 y.o. female who presents to the emergency department complaining of abdominal pain it is occasionally burning, occasionally sharp, no real alleviating or aggravating factors.  It is left-sided and upper without radiation.  She reports some intermittent nausea as well as some diarrhea, no blood in her stool or dark tarry stool.  No fevers or chills or cough or congestion.  No recent antibiotic use.  No dysuria or hematuria although she does state she does not feel like she fully empties when she urinates.  No vaginal bleeding or discharge.  No lower abdominal pain    REVIEW OF SYSTEMS  Pertinent positives include abdominal pain. Pertinent negatives include no vomiting. See HPI for details. All other systems reviewed and are negative.    PAST MEDICAL HISTORY   has a past medical history of Dental disorder, Diabetes (HCC), High cholesterol, and Pain.    SURGICAL HISTORY   has a past surgical history that includes exploratory laparotomy (age 17) and joann by laparoscopy (2021).    FAMILY HISTORY  Family History   Problem Relation Age of Onset   • Diabetes Mother    • Cancer Maternal Aunt 45   • Cancer Paternal Aunt         stomach   • Heart Disease Maternal Grandfather    • Stroke Neg Hx        SOCIAL HISTORY  Social History     Socioeconomic History   • Marital status:      Spouse name: Not on file   • Number of children: Not on file   • Years of education: Not on file   • Highest education level: Not on file   Occupational History   • Not on file   Tobacco Use   • Smoking status: Former Smoker     Types: Cigarettes     Quit date: 2021     Years since quittin.6   • Smokeless tobacco: Never Used   Vaping Use   • Vaping Use: Never used   Substance and Sexual Activity   • Alcohol use: No   • Drug use: Not Currently     Comment: rare    • Sexual activity: Yes     Partners: Male   Other Topics Concern   • Not on file   Social History Narrative   • Not on file     Social Determinants of Health     Financial Resource Strain:    • Difficulty of Paying Living Expenses: Not on file   Food Insecurity:    • Worried About Running Out of Food in the Last Year: Not on file   • Ran Out of Food in the Last Year: Not on file   Transportation Needs:    • Lack of Transportation (Medical): Not on file   • Lack of Transportation (Non-Medical): Not on file   Physical Activity:    • Days of Exercise per Week: Not on file   • Minutes of Exercise per Session: Not on file   Stress:    • Feeling of Stress : Not on file   Social Connections:    • Frequency of Communication with Friends and Family: Not on file   • Frequency of Social Gatherings with Friends and Family: Not on file   • Attends [employer] Services: Not on file   • Active Member of Clubs or Organizations: Not on file   • Attends Club or Organization Meetings: Not on file   • Marital Status: Not on file   Intimate Partner Violence:    • Fear of Current or Ex-Partner: Not on file   • Emotionally Abused: Not on file   • Physically Abused: Not on file   • Sexually Abused: Not on file   Housing Stability:    • Unable to Pay for Housing in the Last Year: Not on file   • Number of Places Lived in the Last Year: Not on file   • Unstable Housing in the Last Year: Not on file      Social History     Substance and Sexual Activity   Drug Use Not Currently    Comment: rare       CURRENT MEDICATIONS  Home Medications     Reviewed by Laron Mares R.N. (Registered Nurse) on 11/12/21 at 0917  Med List Status: Partial   Medication Last Dose Status   Cobalamin Combinations (NEURIVA PLUS) Cap  Active   ondansetron (ZOFRAN ODT) 4 MG TABLET DISPERSIBLE  Active                ALLERGIES  Allergies   Allergen Reactions   • Sulfa Drugs Unspecified     RXN= unknown as a kid       PHYSICAL EXAM  VITAL SIGNS: /68   Pulse 70   " Temp 36.3 °C (97.3 °F) (Temporal)   Resp 16   Ht 1.626 m (5' 4\")   Wt 70 kg (154 lb 5.2 oz)   LMP 11/04/2021   SpO2 94%   BMI 26.49 kg/m²   Pulse ox interpretation: I interpret this pulse ox as normal.    Constitutional: Alert in no apparent distress.  HENT: No signs of trauma, Bilateral external ears normal, Nose normal.   Eyes: Pupils are equal and reactive, Conjunctiva normal, Non-icteric.   Neck: Normal range of motion, No tenderness, Supple, No stridor.   Lymphatic: No lymphadenopathy noted.   Cardiovascular: Regular rate and rhythm, no murmurs.   Thorax & Lungs: Normal breath sounds, No respiratory distress, No wheezing, No chest tenderness.   Abdomen: Bowel sounds normal, Soft, left upper quadrant tenderness, No masses, No pulsatile masses. No peritoneal signs.  Skin: Warm, Dry, No erythema, No rash.   Back: No bony tenderness, No CVA tenderness.   Extremities: Intact distal pulses, No edema, No tenderness, No cyanosis, Negative Cherry's sign.  Musculoskeletal: Good range of motion in all major joints. No tenderness to palpation or major deformities noted.   Neurologic: Alert , Normal motor function, Normal sensory function, No focal deficits noted.   Psychiatric: Affect normal, Judgment normal, Mood normal.     DIAGNOSTIC STUDIES / PROCEDURES      LABS  Labs Reviewed   COMP METABOLIC PANEL - Abnormal; Notable for the following components:       Result Value    Potassium 3.3 (*)     Glucose 104 (*)     Albumin 5.2 (*)     All other components within normal limits   URINALYSIS,CULTURE IF INDICATED - Abnormal; Notable for the following components:    Ketones 15 (*)     Leukocyte Esterase Small (*)     Occult Blood Small (*)     All other components within normal limits    Narrative:     Indication for culture:->Patient WITHOUT an indwelling Johns  catheter in place with new onset of Dysuria, Frequency,  Urgency, and/or Suprapubic pain   URINE MICROSCOPIC (W/UA) - Abnormal; Notable for the following " components:    WBC 5-10 (*)     RBC 2-5 (*)     Bacteria Few (*)     Epithelial Cells Moderate (*)     All other components within normal limits    Narrative:     Indication for culture:->Patient WITHOUT an indwelling Johns  catheter in place with new onset of Dysuria, Frequency,  Urgency, and/or Suprapubic pain   CBC WITH DIFFERENTIAL   LIPASE   HCG QUAL SERUM   ESTIMATED GFR       All labs reviewed by me.    RADIOLOGY  CT-ABDOMEN-PELVIS WITH   Final Result      1.  No acute findings.   2.  Normal appendix.   3.  No CT evidence for colitis or diverticulitis.        The radiologist's interpretation of all radiological studies have been reviewed and images independently viewed by me.    COURSE & MEDICAL DECISION MAKING  Nursing notes, VS, PMSFHx reviewed in chart.    9:29 AM Patient seen and examined at bedside. Patient will be treated with toradol. Ordered for cbc, cmp, ua, pregnancy, lipase, CT to evaluate her symptoms.     11:15 AM  Patient is reevaluated patient is reevaluated, she is comfortable at this time, pending CT    12:12 PM  discussed results and plan for discharge to which the patient is agreeable          Decision Making:  This is a 30 y.o. female presenting with left-sided upper abdominal pain.  This does seem more gastric in nature at this time, will prescribe omeprazole, dietary changes, follow-up through primary care as well as GI as needed.  She has no lower abdominal pain or tenderness to suggest pelvic pathology, ovarian torsion, appendicitis and no CT findings to suggest this diagnosis.  There is no evidence of colitis or enteritis as well, perforation or obstruction.  Lipase is normal    The patient will return for new or worsening symptoms and is stable at the time of discharge.    The patient is referred to a primary physician for blood pressure management, diabetic screening, and for all other preventative health concerns.      DISPOSITION:  Patient will be discharged home in stable  condition.    FOLLOW UP:  Devyn Mendoza M.D.  [address] [address]  [phone]    On 11/16/2021        OUTPATIENT MEDICATIONS:  New Prescriptions    OMEPRAZOLE (PRILOSEC) 40 MG DELAYED-RELEASE CAPSULE    Take 1 Capsule by mouth every day.     '    FINAL IMPRESSION  1. LUQ abdominal pain          The note accurately reflects work and decisions made by me.  Gabe Valencia M.D.  11/12/2021  12:36 PM

## 2021-11-12 NOTE — ED NOTES
Provided pt with phone number for ED supervisor/director as well as Service Excellence. Pt states she feels comfortable for discharge.

## 2021-11-12 NOTE — ED TRIAGE NOTES
Pt amb to triage.  Chief Complaint   Patient presents with   • LUQ Pain     x1d   • Diarrhea     Symptoms x1d. Protocol initiated.  Urine sample to lab.

## 2021-11-15 ENCOUNTER — PATIENT MESSAGE (OUTPATIENT)
Dept: MEDICAL GROUP | Facility: MEDICAL CENTER | Age: 31
End: 2021-11-15

## 2022-08-11 ENCOUNTER — APPOINTMENT (OUTPATIENT)
Dept: INTERNAL MEDICINE | Facility: OTHER | Age: 32
End: 2022-08-11
Payer: MEDICAID

## 2022-08-23 ENCOUNTER — APPOINTMENT (OUTPATIENT)
Dept: INTERNAL MEDICINE | Facility: OTHER | Age: 32
End: 2022-08-23
Payer: MEDICAID

## 2022-09-21 ENCOUNTER — OFFICE VISIT (OUTPATIENT)
Dept: INTERNAL MEDICINE | Facility: OTHER | Age: 32
End: 2022-09-21
Payer: MEDICAID

## 2022-09-21 VITALS
HEIGHT: 64 IN | OXYGEN SATURATION: 99 % | WEIGHT: 157.2 LBS | TEMPERATURE: 97.7 F | SYSTOLIC BLOOD PRESSURE: 120 MMHG | BODY MASS INDEX: 26.84 KG/M2 | DIASTOLIC BLOOD PRESSURE: 74 MMHG | HEART RATE: 60 BPM

## 2022-09-21 DIAGNOSIS — E78.5 HYPERLIPIDEMIA, UNSPECIFIED HYPERLIPIDEMIA TYPE: ICD-10-CM

## 2022-09-21 DIAGNOSIS — R73.03 PREDIABETES: ICD-10-CM

## 2022-09-21 DIAGNOSIS — R05.9 COUGH: ICD-10-CM

## 2022-09-21 DIAGNOSIS — G89.29 CHRONIC RIGHT SHOULDER PAIN: ICD-10-CM

## 2022-09-21 DIAGNOSIS — M25.511 CHRONIC RIGHT SHOULDER PAIN: ICD-10-CM

## 2022-09-21 DIAGNOSIS — R04.2 BLOOD IN SPUTUM: ICD-10-CM

## 2022-09-21 DIAGNOSIS — R53.83 OTHER FATIGUE: ICD-10-CM

## 2022-09-21 DIAGNOSIS — R53.83 FATIGUE, UNSPECIFIED TYPE: ICD-10-CM

## 2022-09-21 PROCEDURE — 99204 OFFICE O/P NEW MOD 45 MIN: CPT | Mod: GC | Performed by: STUDENT IN AN ORGANIZED HEALTH CARE EDUCATION/TRAINING PROGRAM

## 2022-09-21 ASSESSMENT — ENCOUNTER SYMPTOMS
SPUTUM PRODUCTION: 1
HEADACHES: 1
HEMOPTYSIS: 1
COUGH: 1

## 2022-09-21 ASSESSMENT — FIBROSIS 4 INDEX: FIB4 SCORE: .7542472332656506926

## 2022-09-21 ASSESSMENT — PATIENT HEALTH QUESTIONNAIRE - PHQ9: CLINICAL INTERPRETATION OF PHQ2 SCORE: 0

## 2022-09-21 NOTE — PROGRESS NOTES
"    New Patient    CC:  Chief Complaint   Patient presents with    New Patient     Establish    Other     Blood in spit since quitting smoking    Shoulder Pain     Right shoulder    Fatigue     For about a year     HPI:  Frances Panchal is a 31 y.o. female without significant medical history, who presents today to establish care. She reports that her main concern is that she has some sort of cancer, due to her cough with specks of blood and the development of a \"blood cancer\" in a family friend.     Patient reports that she has been coughing, although cough has now resolved, chest burns with the coughing, no SOB. When she noticed blood in her expectorant with \"deep coughing,\" she immediately stopped smoking marijuana. She was smoking 1/8 per day at the time. She quit 1.5 months ago. 1 month ago, the coughing resolved.     She also reports fatigue that has been going on for the past year. She has been \"sleeping all day and all night.\" And doesn't feel rested after 10 hours of sleep. She describes it as her \"bones hurting.\" No trigger. Nothing makes it better.     Patient also wants XR for shoulder pain. Of note she currently works at [employer].     COVID vaccines yes. Declines boosters  Flu yes  Tdap yes  Pap 4/2021 which was normal without HPV. History of low-grade squamous intraepithelial lesion in past.   Mammogram. Normal. No further screenings recommended  Hep C neg  HIV neg  A1c 5.1 in 8/2021  ASCVD score    ROS:  Review of Systems   Constitutional:  Positive for malaise/fatigue.   Respiratory:  Positive for cough, hemoptysis and sputum production.    Musculoskeletal:  Positive for joint pain (R shoulder).   Neurological:  Positive for headaches.   All other systems reviewed and are negative.    Past Medical History:   Diagnosis Date    Dental disorder     missing teeth, cracked front tooth    Diabetes (HCC)     \"on the verge of diabetes\"    High cholesterol     \"on the verge\"    Pain     abdominal " "pain from gall bladder     Past Surgical History:   Procedure Laterality Date    TIA BY LAPAROSCOPY  2021    Procedure: CHOLECYSTECTOMY, LAPAROSCOPIC.;  Surgeon: Sanchez Agee M.D.;  Location: SURGERY [facility];  Service: General    EXPLORATORY LAPAROTOMY  age 17    spleen hemorrage, not removed     Family History   Problem Relation Age of Onset    Diabetes Mother     Cancer Maternal Aunt 45    Cancer Paternal Aunt         stomach    Heart Disease Maternal Grandfather     Stroke Neg Hx      Social History     Tobacco Use    Smoking status: Former     Types: Cigarettes     Quit date: 2021     Years since quittin.4    Smokeless tobacco: Never   Vaping Use    Vaping Use: Never used   Substance Use Topics    Alcohol use: No    Drug use: Not Currently     Comment: rare     No current outpatient medications on file.     No current facility-administered medications for this visit.     Physical Exam:  /74 (BP Location: Left arm, Patient Position: Sitting, BP Cuff Size: Adult)   Pulse 60   Temp 36.5 °C (97.7 °F) (Temporal)   Ht 1.626 m (5' 4\")   Wt 71.3 kg (157 lb 3.2 oz)   SpO2 99%   BMI 26.98 kg/m²   General: Well-developed, well-nourished female in no distress  Lungs: Clear to auscultation bilaterally with good respiratory effort.  No wheezes, crackes or rhonci are heard.  Heart: Normal rate, regular rhythm   Abdomen: Soft, non-tender, non-distended.   Extremites: No edema  Psych: Patient is awake, alert and oriented with recent and remote memory intact. Mood and affect are normal.     Assessment and Plan:   Cough with bloodtinged sputum  Patient reports that she has been coughing, although cough has now resolved, chest burns with the coughing, no SOB. When she noticed blood in her expectorant with \"deep coughing,\" she immediately stopped smoking marijuana. She was smoking 1/8 per day at the time. She quit 1.5 months ago. 1 month ago, the coughing resolved. CXR ordered to evaluate for " "potential pulmonary etiology causing cough and blood tinged sputum    Fatigue  She also reports fatigue that has been going on for the past year. She has been \"sleeping all day and all night.\" And doesn't feel rested after 10 hours of sleep. She describes it as her \"bones hurting.\" No trigger. Nothing makes it better. Will order labwork with TSH    Chronic right shoulder pain  Patient also wants XR for shoulder pain. Of note she currently works at a [employer]     Follow up in 2 wk to discuss labs and imaging  She also needs to discuss headache, shoulder pain, and anxiety at next visit, as these were not discussed this visit.   "

## 2022-09-22 ENCOUNTER — HOSPITAL ENCOUNTER (OUTPATIENT)
Dept: RADIOLOGY | Facility: MEDICAL CENTER | Age: 32
End: 2022-09-22
Attending: STUDENT IN AN ORGANIZED HEALTH CARE EDUCATION/TRAINING PROGRAM
Payer: MEDICAID

## 2022-09-22 DIAGNOSIS — R53.83 OTHER FATIGUE: ICD-10-CM

## 2022-09-22 DIAGNOSIS — R05.9 COUGH: ICD-10-CM

## 2022-09-22 DIAGNOSIS — R04.2 BLOOD IN SPUTUM: ICD-10-CM

## 2022-09-22 DIAGNOSIS — G89.29 CHRONIC RIGHT SHOULDER PAIN: ICD-10-CM

## 2022-09-22 DIAGNOSIS — R73.03 PREDIABETES: ICD-10-CM

## 2022-09-22 DIAGNOSIS — M25.511 CHRONIC RIGHT SHOULDER PAIN: ICD-10-CM

## 2022-09-22 DIAGNOSIS — E78.5 HYPERLIPIDEMIA, UNSPECIFIED HYPERLIPIDEMIA TYPE: ICD-10-CM

## 2022-09-22 PROCEDURE — 71046 X-RAY EXAM CHEST 2 VIEWS: CPT

## 2022-09-22 PROCEDURE — 73030 X-RAY EXAM OF SHOULDER: CPT | Mod: RT

## 2022-09-22 NOTE — ASSESSMENT & PLAN NOTE
"She also reports fatigue that has been going on for the past year. She has been \"sleeping all day and all night.\" And doesn't feel rested after 10 hours of sleep. She describes it as her \"bones hurting.\" No trigger. Nothing makes it better. Will order labwork with TSH  "

## 2022-09-22 NOTE — ASSESSMENT & PLAN NOTE
"Patient reports that she has been coughing, although cough has now resolved, chest burns with the coughing, no SOB. When she noticed blood in her expectorant with \"deep coughing,\" she immediately stopped smoking marijuana. She was smoking 1/8 per day at the time. She quit 1.5 months ago. 1 month ago, the coughing resolved. CXR ordered to evaluate for potential pulmonary etiology causing cough and blood tinged sputum  "

## 2022-10-10 ENCOUNTER — APPOINTMENT (OUTPATIENT)
Dept: INTERNAL MEDICINE | Facility: OTHER | Age: 32
End: 2022-10-10

## 2023-01-25 ENCOUNTER — OFFICE VISIT (OUTPATIENT)
Dept: INTERNAL MEDICINE | Facility: OTHER | Age: 33
End: 2023-01-25
Payer: MEDICAID

## 2023-01-25 VITALS
BODY MASS INDEX: 29.53 KG/M2 | WEIGHT: 173 LBS | HEIGHT: 64 IN | OXYGEN SATURATION: 97 % | SYSTOLIC BLOOD PRESSURE: 125 MMHG | HEART RATE: 63 BPM | DIASTOLIC BLOOD PRESSURE: 79 MMHG | TEMPERATURE: 98.1 F

## 2023-01-25 DIAGNOSIS — Z01.419 ENCOUNTER FOR CERVICAL PAP SMEAR WITH PELVIC EXAM: ICD-10-CM

## 2023-01-25 PROBLEM — R53.83 FATIGUE: Status: RESOLVED | Noted: 2022-09-21 | Resolved: 2023-01-25

## 2023-01-25 PROBLEM — R05.9 COUGH: Status: RESOLVED | Noted: 2022-09-21 | Resolved: 2023-01-25

## 2023-01-25 PROCEDURE — 99999 PR NO CHARGE: CPT | Mod: GC | Performed by: STUDENT IN AN ORGANIZED HEALTH CARE EDUCATION/TRAINING PROGRAM

## 2023-01-25 PROCEDURE — 99213 OFFICE O/P EST LOW 20 MIN: CPT | Performed by: STUDENT IN AN ORGANIZED HEALTH CARE EDUCATION/TRAINING PROGRAM

## 2023-01-25 ASSESSMENT — PATIENT HEALTH QUESTIONNAIRE - PHQ9: CLINICAL INTERPRETATION OF PHQ2 SCORE: 0

## 2023-01-25 ASSESSMENT — FIBROSIS 4 INDEX: FIB4 SCORE: .7778174593052022767

## 2023-01-25 NOTE — PROGRESS NOTES
"Chief Complaint   Patient presents with    Establish Care       HISTORY OF PRESENT ILLNESS: Frances Panchal is a 32 y.o. female established patient who presents today for concern over vaginal abnormality.  PMHx significant for anxiety, previous dysuria.    1. Encounter for cervical Pap smear with pelvic exam  Patient reports she had a protrusion from vagina a few days ago.  This is confirmed by  who is present in exam room today, along with patient's mother.  They described as fleshy with the appearance of veins.  She is not sure of the size and states it has never happened before.  They have not attempted intercourse since the protrusion occurred.  Patient is concerned over possible prolapse or hematoma.  She has 3 children.  She denies abnormal bleeding or discharge other than physiologic discharge.  She reports that she is on her period today.    Additionally, she notes that she has not had a Pap smear in some time and previously had an abnormal result but she cannot recall what it was.  We discussed obtaining cervical cancer screening in addition to visualization to ensure no anatomic abnormality.      Past Medical History:   Diagnosis Date    Dental disorder     missing teeth, cracked front tooth    Diabetes (HCC)     \"on the verge of diabetes\"    High cholesterol     \"on the verge\"    Pain     abdominal pain from gall bladder       Patient Active Problem List    Diagnosis Date Noted    Cough with bloodtinged sputum 09/21/2022    Fatigue 09/21/2022    Chronic right shoulder pain 09/21/2022    Neck mass 07/12/2018    Persistent nodularity of breast 07/12/2018    Thoracic myofascial strain 07/12/2018    Anxiety 07/12/2018    Dysuria 07/12/2018       Allergies:Sulfa drugs    No current outpatient medications on file.     No current facility-administered medications for this visit.       Social History     Tobacco Use    Smoking status: Former     Types: Cigarettes     Quit date: 4/1/2021     Years since " "quittin.8    Smokeless tobacco: Never   Vaping Use    Vaping Use: Never used   Substance Use Topics    Alcohol use: No    Drug use: Not Currently     Comment: rare       Family History   Problem Relation Age of Onset    Diabetes Mother     Cancer Maternal Aunt 45    Cancer Paternal Aunt         stomach    Heart Disease Maternal Grandfather     Stroke Neg Hx          Review of Systems   ROS  As above in HPI.  All other systems reviewed and negative.    Exam:  /79 (BP Location: Left arm, Patient Position: Sitting, BP Cuff Size: Adult)   Pulse 63   Temp 36.7 °C (98.1 °F) (Temporal)   Ht 1.626 m (5' 4\")   Wt 78.5 kg (173 lb)   SpO2 97%  Body mass index is 29.7 kg/m².    Constitutional:  NAD, well appearing.  HEENT:   NC/AT  Cardiovascular: RRR.   No m/r/g. No carotid bruits.       Lungs:   CTAB, no w/r/r, no respiratory distress.  Abdomen: Soft, NT/ND + BS, no masses, no suprapubic tenderness, no hepatomegaly.  Extremities:  2+ DP and radial pulses bilaterally.  No c/c/e.  Skin:  Warm and dry.    Neurologic: Alert & oriented x 3, CN II-XII grossly intact, strength and sensation grossly intact.  No focal deficits noted.  Psychiatric:  Affect normal, mood normal, judgment normal.  Genitourinary: Normal external appearance of inguinal and labial region.  No rash, erythema, lesions noted.  No protrusions noted.  Speculum exam within normal limits.  Scant bleeding upon insertion consistent with menstruation.  No prolapsing tissue was noted.  No hematomas, cysts noted.  Largely normal exam findings.    Assessment/Plan:     1. Encounter for cervical Pap smear with pelvic exam  Reassurance provided to patient based on above physical exam findings.  Counseled patient regarding signs of prolapse, she expressed understanding.  Discussed surgical options if recurrence, use of a pessary, continued ability to have sex.  Also discussed monitoring for difficulty urinating, discoloration, signs of infection.  Pap smear " obtained, sample sent for analysis.  Consider referral to OB/GYN if recurrence of symptoms.  - THINPREP PAP WITH HPV; Future    Routine follow-up for regular medical work.  Patient expressed understanding to call sooner than 6 months if recurrence of symptoms or other signs or concerns.    All imaging results and lab results and consult notes are reviewed at this visit.  Followup: No follow-ups on file.

## 2023-02-03 DIAGNOSIS — Z01.419 ENCOUNTER FOR CERVICAL PAP SMEAR WITH PELVIC EXAM: ICD-10-CM

## 2023-03-09 ENCOUNTER — OFFICE VISIT (OUTPATIENT)
Dept: INTERNAL MEDICINE | Facility: OTHER | Age: 33
End: 2023-03-09
Payer: MEDICAID

## 2023-03-09 ENCOUNTER — TELEPHONE (OUTPATIENT)
Dept: INTERNAL MEDICINE | Facility: OTHER | Age: 33
End: 2023-03-09

## 2023-03-09 VITALS
HEIGHT: 64 IN | OXYGEN SATURATION: 99 % | BODY MASS INDEX: 29.4 KG/M2 | HEART RATE: 63 BPM | WEIGHT: 172.2 LBS | DIASTOLIC BLOOD PRESSURE: 61 MMHG | SYSTOLIC BLOOD PRESSURE: 104 MMHG | TEMPERATURE: 97.5 F

## 2023-03-09 DIAGNOSIS — R19.7 DIARRHEA OF PRESUMED INFECTIOUS ORIGIN: ICD-10-CM

## 2023-03-09 DIAGNOSIS — Z30.431 IUD CHECK UP: ICD-10-CM

## 2023-03-09 PROCEDURE — 99213 OFFICE O/P EST LOW 20 MIN: CPT | Mod: GE | Performed by: STUDENT IN AN ORGANIZED HEALTH CARE EDUCATION/TRAINING PROGRAM

## 2023-03-09 ASSESSMENT — FIBROSIS 4 INDEX: FIB4 SCORE: .7778174593052022767

## 2023-03-09 ASSESSMENT — PATIENT HEALTH QUESTIONNAIRE - PHQ9: CLINICAL INTERPRETATION OF PHQ2 SCORE: 0

## 2023-03-09 NOTE — PROGRESS NOTES
Teaching Physician Attestation      Level of Participation    I discussed with the resident physician the patient's history, exam, assessment and plan in detail.  Topics listed in my addendum were the focus of the visit.  Healthcare maintenance was not addressed this visit unless listed as a topic in my addendum.  I agree with plan as written along with the following additions/modifications:      Diarrhea, ? parasitic  -Patient reports getting a new puppy who has a visible worm infection with worms coming out of the rectum and in stool.  Shortly thereafter, all members of her family developed diarrhea, in her case bloody.  -non-surgical abdomen, not lightheaded, vital signs stable although BP slightly below her typical baseline.    Plan  -Aggressive hydration with electrolyte containing solutions  -Check stool ova and parasites and cultures, CBC  -If lightheaded or signs of dehydration patient reports to the emergency room for IV hydration  -If parasitic infection will treat, if bacterial or viral infection we will monitor closely for resolution  -Follow-up in 1 week, if his bacterial infection and is not improving at that time consider antibiotics and further evaluation

## 2023-03-10 NOTE — TELEPHONE ENCOUNTER
Patient was called at 18:32 and a voicemail message was left informing the patient that two additional labs (fecal lactoferrin and e coli shiga toxin) need to be ordered. She was also given the phone number of the clinic if she needs a lab slip printed or if she has questions.

## 2023-03-10 NOTE — TELEPHONE ENCOUNTER
Pt called back and asked if the new labs could be applied to the stool sample that she already gave right after her appointment. Pt had it done at [facility] and was told by them to have us call them and authorize, phone number is [phone]

## 2023-03-10 NOTE — PROGRESS NOTES
"Established Patient    Frances Panchal is a 32 y.o. female who presents today with the following:    CC: diarrhea and needs referral to ob/gyn to discuss IUD's    HPI: 33 yo F here for acute visit related to diarrhea. Her family got a new puppy 2 weeks ago and 4 days later she had abdominal pain, diarrhea with some blood mixed in, and fatigue. Her  and children have similar symptoms but it is unclear if her family members have blood in their diarrhea. The patient also reports her puppy has been passing worms and showed a picture of the puppy's stool and worms which appear to possibly be ascaris species. She does not have an acute abdomen and there are no signs of dehydration or anemia.She denies foreign travel, outside sick contacts, camping, drinking stream water, or other animal exposure.    ROS: A 12 point ROS is negative except where mentioned in the HPI    Patient Active Problem List    Diagnosis Date Noted    Diarrhea 2023    Chronic right shoulder pain 2022    Neck mass 2018    Persistent nodularity of breast 2018    Thoracic myofascial strain 2018    Anxiety 2018    Dysuria 2018       Social History     Tobacco Use    Smoking status: Former     Types: Cigarettes     Quit date: 2021     Years since quittin.9    Smokeless tobacco: Never   Vaping Use    Vaping Use: Never used   Substance Use Topics    Alcohol use: No    Drug use: Not Currently     Comment: rare       No current outpatient medications on file.     No current facility-administered medications for this visit.       /61 (BP Location: Left arm, Patient Position: Sitting, BP Cuff Size: Adult long)   Pulse 63   Temp 36.4 °C (97.5 °F) (Temporal)   Ht 1.613 m (5' 3.5\")   Wt 78.1 kg (172 lb 3.2 oz)   SpO2 99%   BMI 30.03 kg/m²     Physical Exam  General: Well developed, well nourished female, in no distress.  Eyes: Conjuntiva without any obvious injection or erythema. "   Cardiovascular: Heart is regular with no murmurs  Lungs: Clear to auscultation bilaterally. No wheezes, rhonci or crackles heard. Respiratory effort is normal.  Abd: Soft, mildly tender on palpation in the LUQ and LLQ  Ext: No edema      Assessment and Plan    1. Diarrhea of presumed infectious origin  Given history it may be ascariasis or campylobacter infection but differential diagnosis includes shigella or other infectious etiology. Less likely on the differential would be an inflammatory bowel condition.  -O+P  -Stool cultures  -fecal lactoferrin  -e coli shiga toxin  -CBC to assess for anemia, leukocytosis, or eosinophilia  -recommend hydration with gatorade or pedialyte to prevent electrolyte loss    2. IUD check up  -referred to OB GYN for IUD counseling    [facility] patient case discussed with Dr. Ruby    Signed by: Daryl Gr M.D.

## 2023-03-16 DIAGNOSIS — R19.7 DIARRHEA OF PRESUMED INFECTIOUS ORIGIN: ICD-10-CM

## 2023-04-18 ENCOUNTER — TELEMEDICINE (OUTPATIENT)
Dept: INTERNAL MEDICINE | Facility: OTHER | Age: 33
End: 2023-04-18
Payer: MEDICAID

## 2023-04-18 VITALS — HEIGHT: 63 IN | BODY MASS INDEX: 30.12 KG/M2 | WEIGHT: 170 LBS

## 2023-04-18 DIAGNOSIS — K52.9 CHRONIC DIARRHEA: Primary | ICD-10-CM

## 2023-04-18 DIAGNOSIS — D72.9 NEUTROPHILIA: ICD-10-CM

## 2023-04-18 DIAGNOSIS — R10.84 DIFFUSE ABDOMINAL PAIN: ICD-10-CM

## 2023-04-18 PROCEDURE — 99214 OFFICE O/P EST MOD 30 MIN: CPT | Mod: GT,GC | Performed by: STUDENT IN AN ORGANIZED HEALTH CARE EDUCATION/TRAINING PROGRAM

## 2023-04-18 ASSESSMENT — FIBROSIS 4 INDEX: FIB4 SCORE: .7778174593052022767

## 2023-04-18 NOTE — PATIENT INSTRUCTIONS
Thank you for visiting today!  Please follow-up in the next 4 to 5 weeks with Dr. En Pressley.  Please follow-up on referrals and schedule an appointment with gastroenterology, can take a while to get in to see the gastroenterologist so please let us know if you do not get the referral in 10 days.  Please get lab work done at least 2 weeks before next appointment, we will discuss results at next visit.   Please try and eat healthy, get at least 30 minutes of cardiovascular exercise a day to help keep your health as best as it can be.  If you have any questions or concerns please feel free to contact us at [phone].  If you feel like you are experiencing a medical emergency please seek immediate medical attention at an urgent care or in the emergency department.     [facility]  [address]  [facility] [address]  Phone: [phone]

## 2023-04-18 NOTE — PROGRESS NOTES
"         Established Patient    Patient Care Team:  En Pressley M.D. as PCP - General (Internal Medicine)  Pcp Pt States None    Frances Panchal is a 32 y.o. female who presents today with the following Chief Complaint(s): Follow up for The primary encounter diagnosis was Chronic diarrhea. Diagnoses of Diffuse abdominal pain and Neutrophilia were also pertinent to this visit.    HPI:  Ms. Panchal is a very pleasant 32-year-old female with past medical history significant for type 2 diabetes mellitus last A1c in only per my chart review 5.1), muscle strain, anxiety, who is seen by Dr. Daryl Gr on 3/9/2023 for concern for diarrhea presumed infectious origin, patient had a puppy with possible ringworm?  And was concerned that she had a bout of bloody diarrhea.  Patient was concerned about laboratory follow-up which included a mild neutrophilia of 7833 with an otherwise unremarkable CBC, patient presents bloody diarrhea have resolved, as well as a negative stool O&P.  However, patient states the diarrhea has been chronic for approximately 3 years.  Patient notes loose soft stools that are pale to tan in color happening approximately 5 times per day every day for the last 3 years, patient also states that immediately upon eating she feels abdominal pain relieved by defecation, without significant weight loss.  Patient is also feels fatigued for many years, patient does have a history of cholecystectomy in 8/2021 however the symptoms predate, patient also has a history of type 2 diabetes mellitus never requiring insulin, no personal history of autoimmune disease but nebulous family history.  Patient denies any NSAID or alcohol use, patient eats \"a normal diet\" has never tried food restriction, no abdominal surgery history other than cholecystectomy.  Denies any melena, hematochezia, is positive for possible steatorrhea.  Hematochezia in the past has been sporadic happening once or twice over the last few years " "and usually only 1 stool with bright red blood without history of hemorrhoids.  14 point review of systems negative except for as below.  This evaluation was conducted via Zoom using secure and encrypted videoconferencing technology. The patient was in their home in the [facility].    The patient's identity was confirmed and verbal consent was obtained for this virtual visit.     ROS:     General: No fevers, chills, night sweats, weight loss or gain  HEENT: No hearing changes, vision changes, eye pain, ear pain, nasal discharge, sore throat  Neck: No swelling in neck  Pulmonary: No shortness of breath, cough, sputum, or hemoptysis  Cardiovascular: No chest pain, palpitations, or LE swelling  GI: As per HPI  : No dysuria or frequency  Neuro: No focal weakness, no general weakness, no headaches, no lightheadedness, no dizziness  Psych: No anxiety or depression    Past Medical History:   Diagnosis Date    Dental disorder     missing teeth, cracked front tooth    Diabetes (HCC)     \"on the verge of diabetes\"    High cholesterol     \"on the verge\"    Pain     abdominal pain from gall bladder     Social History     Tobacco Use    Smoking status: Former     Types: Cigarettes     Quit date: 2021     Years since quittin.0    Smokeless tobacco: Never   Vaping Use    Vaping Use: Never used   Substance Use Topics    Alcohol use: No    Drug use: Not Currently     Comment: rare     No current outpatient medications on file.     No current facility-administered medications for this visit.       Physical Exam:  Ht 1.6 m (5' 3\") Comment: stated  Wt 77.1 kg (170 lb) Comment: stated  BMI 30.11 kg/m²   Vitals obtained by patient:  Constitutional: Alert, no distress, well-groomed.  Skin: No rashes in visible areas.  Eye: Round. Conjunctiva clear, lids normal. No icterus.   ENMT: Lips pink without lesions, good dentition, moist mucous membranes. Phonation normal.  Neck: No masses, no thyromegaly. Moves freely without " pain.  Respiratory: Unlabored respiratory effort, no cough or audible wheeze  Psych: Alert and oriented x3, normal affect and mood.        Assessment and Plan:   Diffuse abdominal pain  Chronic diarrhea  Chronic diarrhea history of approximately 3 years, [place] stool 6 through 7, positive steatorrhea negative melena, intermittent bloody stools however regular approximately 1/year, no mucus, has not tried future restriction in the past, no history personally but family autoimmune history no change in stool caliber, no weight loss, no regular bloody stools, + for  Differential diagnosis: Malabsorptive process versus IBD versus IBS versus celiac versus SBO versus bile salt diarrhea versus pancreatic insufficiency.  Testing as below:  - CRP QUANTITIVE (NON-CARDIAC); Future  - NEUTRAL FAT; Future  - T-TRANSGLUTAMINASE (TTG) IGA; Future  - IGA SERUM QUANT; Future  - VITAMIN B12; Future  - CALPROTECTIN,FECAL; Future  - Referral to Gastroenterology  - GIARDIA: DIRECT EIA  - PANCREATIC ELASTASE, FECAL; Future  - VITAMIN D,25 HYDROXY (DEFICIENCY); Future  - CBC WITH DIFFERENTIAL; Future  -Strict return precautions given  -Discussed red flag signs and symptoms  -Please follow-up with PCP and gastroenterology for endoscopy/colonoscopy  - For completeness sake if above work-up is negative and patient is still symptomatic there are several other diagnostic laboratory testing options available for further work-up.  Could consider glucose or lactose breath test, HIV, iron studies, autoimmune work up, food restrictions.         3. Neutrophilia  Patient with mild neutrophilia in the setting of recovery from recent acute diarrheal illness versus chronic inflammation, patient also had symptoms of UTI that have now resolved around the time of lab draw approximately 1 month ago, no history of leukemia/lymphoma, will repeat CBC as below, could consider peripheral smear if elevated then referral to heme-onc for bone marrow biopsy however  this is less likely would recommend under treating underlying inflammatory condition like possible inflammatory bowel disease.  - CBC WITH DIFFERENTIAL; Future           Return in about 4 weeks (around 5/16/2023).    Patient Instructions   Thank you for visiting today!  Please follow-up in the next 4 to 5 weeks with Dr. En Pressley.  Please follow-up on referrals and schedule an appointment with gastroenterology, can take a while to get in to see the gastroenterologist so please let us know if you do not get the referral in 10 days.  Please get lab work done at least 2 weeks before next appointment, we will discuss results at next visit.   Please try and eat healthy, get at least 30 minutes of cardiovascular exercise a day to help keep your health as best as it can be.  If you have any questions or concerns please feel free to contact us at [phone].  If you feel like you are experiencing a medical emergency please seek immediate medical attention at an urgent care or in the emergency department.     [facility]  [address] [facility] [address] [place] [address]  Phone: [phone]      Ruiz Burch M.D. PGY 2  [facility]    This note was created using voice recognition software.  While every attempt is made to ensure accuracy of transcription, occasionally errors occur.

## 2023-07-25 ENCOUNTER — GYNECOLOGY VISIT (OUTPATIENT)
Dept: OBGYN | Facility: CLINIC | Age: 33
End: 2023-07-25
Payer: MEDICAID

## 2023-07-25 VITALS
DIASTOLIC BLOOD PRESSURE: 50 MMHG | BODY MASS INDEX: 29.48 KG/M2 | WEIGHT: 166.4 LBS | SYSTOLIC BLOOD PRESSURE: 106 MMHG | HEIGHT: 63 IN

## 2023-07-25 DIAGNOSIS — Z97.5 IUD (INTRAUTERINE DEVICE) IN PLACE: ICD-10-CM

## 2023-07-25 DIAGNOSIS — Z30.433 ENCOUNTER FOR REMOVAL AND REINSERTION OF INTRAUTERINE CONTRACEPTIVE DEVICE (IUD): ICD-10-CM

## 2023-07-25 DIAGNOSIS — Z32.02 PREGNANCY EXAMINATION OR TEST, NEGATIVE RESULT: ICD-10-CM

## 2023-07-25 LAB
POCT INT CON NEG: NEGATIVE
POCT INT CON POS: POSITIVE
POCT URINE PREGNANCY TEST: NEGATIVE

## 2023-07-25 PROCEDURE — 58300 INSERT INTRAUTERINE DEVICE: CPT

## 2023-07-25 PROCEDURE — 3078F DIAST BP <80 MM HG: CPT

## 2023-07-25 PROCEDURE — 3074F SYST BP LT 130 MM HG: CPT

## 2023-07-25 PROCEDURE — 58301 REMOVE INTRAUTERINE DEVICE: CPT

## 2023-07-25 PROCEDURE — 81025 URINE PREGNANCY TEST: CPT

## 2023-07-25 RX ORDER — COPPER 313.4 MG/1
1 INTRAUTERINE DEVICE INTRAUTERINE ONCE
Status: COMPLETED | OUTPATIENT
Start: 2023-07-25 | End: 2023-07-25

## 2023-07-25 RX ADMIN — COPPER 1 EACH: 313.4 INTRAUTERINE DEVICE INTRAUTERINE at 09:57

## 2023-07-25 ASSESSMENT — FIBROSIS 4 INDEX: FIB4 SCORE: .7778174593052022767

## 2023-07-25 NOTE — PROGRESS NOTES
GYN visit for paragard IUD Replacement   LMP: 07/16/2023  Negative UPT today, done in clinic  WT: 116.4 lb  BP: 106/50  Good # [phone]    Last pap: 01/25/2023 Negative

## 2023-07-25 NOTE — PROCEDURES
IUD Removal    Date/Time: 2023 9:54 AM    Performed by: Esther Houston C.N.M.  Authorized by: Esther Houston C.N.M.    Consent:     Consent obtained:  Written    Consent given by:  Patient    Procedure risks and benefits discussed: yes      Patient questions answered: yes      Patient agrees, verbalizes understanding, and wants to proceed: yes    Pre-procedure details:     Reason for removal:  IUD      IUD placed at this facility: no      Length of time IUD in place:  10 years  Procedure:     Pelvic exam performed: no      Speculum placed: yes      IUD strings visualized in external os: yes      Removal mechanism:  Ring forceps    IUD removed intact: yes      IUD type removed:  ParaGard    Removal complications: no    Post-procedure:     New birth control prescribed: yes (paragard IUD placed)      Counseling regarding contraception given: yes    IUD Insertion    Date/Time: 2023 9:55 AM    Performed by: Esther Houston C.N.M.  Authorized by: Esther Houston C.N.M.    Consent:     Consent obtained:  Written and verbal    Consent given by:  Patient    Procedure risks and benefits discussed: yes      Patient questions answered: yes      Patient agrees, verbalizes understanding, and wants to proceed: yes      Educational handouts given: yes      Instructions and paperwork completed: yes    Pre-procedure details:     Negative GC/chlamydia test: collected at time of placement.      Negative urine pregnancy test: yes    Procedure:     Pelvic exam performed: no      Sterile speculum placed in vagina: yes      Cervix visualized: yes      Cervix cleaned and prepped in sterile fashion: yes      Tenaculum applied to cervix: allis clamp applied.      Dilation needed: no      Uterus sounded: yes      Uterus sound depth (cm):  8.5    IUD inserted with no complications: yes      IUD type:  ParaGard    Strings trimmed: yes (3cm)    Post-procedure:     Patient tolerated procedure well: yes      Patient  will follow up after next period: yes    Comments:      A paracervical block of 3cc of 1% plain lidocaine was placed after cervical prep and before allis clamp placement.      Esther Houston C.N.M.

## 2023-07-25 NOTE — PROGRESS NOTES
"Patient presents for IUD removal and replacement. She has had the copper IUD for about 10 years. Reports moderate to heavier flow with regular monthly menses. Declines switch to a hormonal IUD like mirena. Denies recent exposure to STIs or desire for extended STI testing. She sees PCP for annual exams and had a negative pap in Jan 2023.     Vitals:    07/25/23 0931   BP: 106/50   Weight: 166 lb 6.4 oz   Height: 5' 3\"       Prior to IUD insertion, we discussed the advantages and possible disadvantages of IUD use including:  Risk of failure with the subsequent possible surgery for ectopic pregnancy, or risk of miscarriage with intrauterine pregnancy, risk of infection with possible sterility resulting; risk of lost or perforated device and subsequent need for procedure to retrieve. Counseled regarding use, risks, and benefits of IUD.  Procedure explained.  Signed informed consent obtained.     Has been using copper IUD as birthcontrol method since Last menstural period on 7/21/23  UPT today was negative     IUD placed by JEREL SnowNZORA.  See insertion note.     Lot: [id number]  Exp April 2029       Esther Houston C.N.M.    "

## 2023-07-28 DIAGNOSIS — Z30.433 ENCOUNTER FOR REMOVAL AND REINSERTION OF INTRAUTERINE CONTRACEPTIVE DEVICE (IUD): ICD-10-CM

## 2023-08-15 ENCOUNTER — APPOINTMENT (OUTPATIENT)
Dept: INTERNAL MEDICINE | Facility: OTHER | Age: 33
End: 2023-08-15
Payer: MEDICAID

## 2023-09-05 ENCOUNTER — OFFICE VISIT (OUTPATIENT)
Dept: INTERNAL MEDICINE | Facility: OTHER | Age: 33
End: 2023-09-05
Payer: MEDICAID

## 2023-09-05 VITALS
TEMPERATURE: 97.3 F | OXYGEN SATURATION: 98 % | BODY MASS INDEX: 28.77 KG/M2 | SYSTOLIC BLOOD PRESSURE: 119 MMHG | DIASTOLIC BLOOD PRESSURE: 63 MMHG | HEART RATE: 70 BPM | WEIGHT: 162.4 LBS | HEIGHT: 63 IN

## 2023-09-05 DIAGNOSIS — M25.562 ACUTE PAIN OF LEFT KNEE: ICD-10-CM

## 2023-09-05 PROCEDURE — 3074F SYST BP LT 130 MM HG: CPT

## 2023-09-05 PROCEDURE — 3078F DIAST BP <80 MM HG: CPT

## 2023-09-05 PROCEDURE — 99213 OFFICE O/P EST LOW 20 MIN: CPT | Mod: GC

## 2023-09-05 ASSESSMENT — FIBROSIS 4 INDEX: FIB4 SCORE: .7778174593052022767

## 2023-09-06 NOTE — PROGRESS NOTES
Teaching Physician Attestation      Level of Participation    I have personally interviewed and examined the patient.  In addition, I discussed with the resident physician the patient's history, exam, assessment and plan in detail.  Topics listed in my addendum were the focus of the visit.  Healthcare maintenance was not addressed this visit unless listed as a topic in my addendum.  I agree with the plan as written along with the following additions/modifications:    L knee pain likely consistent with an anterolateral meniscal tear  -Patient reports spontaneous onset of left lateral knee pain, worse when bearing weight or when walking upstairs, resolved with rest.  Did not report worsening at night.  On exam she has no dramatic swelling of the knee, negative posterior sag sign, negative anterior and posterior drawer testing, negative valgus and varus stress testing, no significant tenderness of the LCL ligament on palpation or the IT band, some mild tenderness on the insertion of the hamstring tendon behind the knee, positive Thessaly's on the left.  No tenderness to palpation of the kneecap.  She is afebrile.  She has no significant swelling or tenderness on palpation of joints of the fingers or hands.  She is able to stand readily and ambulate for multiple steps bilaterally without any significant issue.  She does have some mild replication/irritation of the knee with palpation of the lateral tibial plateau.    Plan  -I extensively counseled the patient on the low utility of imaging in this situation to change our initial management.  Although spontaneous onset joint pain does raise possibility of a non-msk etiology, the rest of the history and exam do not support a fracture, infection, osteoarthritis, or metastatic/cancer etiology.  With that said, I began the discussion by saying that an xray was not unreasonable in this situation but was low yield.  I subsequently explained that an x-ray would likely not change  management for the most likely diagnosis as we would not be able to see a meniscal tear on an x-ray, and that with an evidence-based physical exam the likelihood that an x-ray would  is extremely low (specifically, the Allen knee rule is negative in this case, thus given the absence of trauma fracture is exceptionally unlikely. the likelihood of any anatomic defects to the knee that did not present during childhood and that would spontaneously present at age 32 is extremely low, likelihood of spontaneous onset OA in a 32 year old very low, no history of cancer).  I mentioned that we could potentially save money by not doing an xray on the knee at this point, and instead going directly to physical therapy for a likely meniscal tear, with close follow up assessment after a short trial.  Patient became irritated, stating that she is the patient and she wanted an x-ray.  I explained that an x-ray was low yield, and that ultimately if she was concerned the next best step for imaging would be an MRI of the knee(given that xray does not capture meniscal pathology), however I cautioned her that her insurance may not pay for an MRI without a trial of physical therapy.  Patient questioned how I knew this.  I explained to the patient that that we deal with issues related to insurance coverage frequently for joint imaging.  Patient again stated that she wished to obtain an x-ray (although ? She meant an MRI, unclear) and became irritated and asked for my name which I provided to her.  She stated 'I am the patient and this is what I am asking for.'  I reviewed again that we deal with knee issues frequently in primary care, that there is an evidence-based approach with significant research behind the predictive values of physical exam findings, and that ultimately I wanted what was best for her.  I discussed the dangers of false positive results on imaging.  Patient inquired about the possibility of  osteoarthritis.  I explained that spontaneous onset of osteoarthritis in a 32-year-old is very unlikely, and that if we were to image young healthy athlete knees we would find many degenerative changes that were not clinically significant, thus we might go down the wrong path of treatment reacting to imaging as opposed to the clinical picture.  I discussed that we could order an MRI if she would like, although I wanted to caution her that it would 1.  Likely not change our management at this stage and 2.  May not be covered by insurance.  Patient specifically requested imaging, and as such we will proceed with an MRI.  She may ultimately require an x-ray before getting the MRI per her insurance, although this will ultimately need to be reviewed after placing the order.    -Ultimately, patient would likely benefit from a trial of physical therapy with follow-up in 5 weeks.    Patient needs to establish with a new resident pcp at follow up.

## 2023-09-06 NOTE — PROGRESS NOTES
Date of Service:  9/5/23    CC: L knee pain    HPI:  Frances Panchal  is a 32 y.o. female with left knee pain.     Left knee pain x 1 week- localized to left lateral knee, aching sensation. Denies any trauma to the area/twisting/injuries. Pt has hx of joints that crack but denies any pain with the cracking. 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 Pain intermittent, worse with the day, any movement make this pain worse and resting makes the pain better. Pain level 0 when she is not moving/bearing weight and can go up to 10/10 with weight bearing. Currently wearing knee brace.  Denies any fever, chills, rash, swelling, erythema.     MCP and PIP hand joint pain x at least 10 years . Always there. Not worsened or better with the day.     Low back with radiation of pain to back of thigh past knee, last for 5 secs.   - occasional   - been there since birth control IUD (2013) taken out last month and replacement with new copper IUD .    Review of systems:  Review of Systems   Constitutional:  Negative for chills and fever.   HENT: Negative.     Eyes: Negative.    Respiratory:  Negative for cough and shortness of breath.    Cardiovascular:  Negative for chest pain.   Gastrointestinal:  Negative for abdominal pain, nausea and vomiting.   Musculoskeletal:  Positive for back pain and joint pain. Negative for myalgias.   Skin: Negative.       Past Medical History:  Patient Active Problem List    Diagnosis Date Noted    IUD (intrauterine device) in place 07/25/2023    Diarrhea 03/09/2023    Chronic right shoulder pain 09/21/2022    Neck mass 07/12/2018    Persistent nodularity of breast 07/12/2018    Thoracic myofascial strain 07/12/2018    Anxiety 07/12/2018    Dysuria 07/12/2018     Past Surgical History:    has a past surgical history that includes exploratory laparotomy (age 17) and joann by laparoscopy (8/25/2021).    Medications:  No current outpatient medications on file.     No current  facility-administered medications for this visit.     Allergies:  Allergies   Allergen Reactions    Sulfa Drugs Unspecified     RXN= unknown as a kid       Family History:   family history includes Cancer in her paternal aunt; Cancer (age of onset: 45) in her maternal aunt; Diabetes in her mother; Heart Disease in her maternal grandfather.     Family hx of arthritis   - brother- rheumatoid? arthritis   - mom - RA  - dad    Social History:    Social History     Tobacco Use    Smoking status: Former     Current packs/day: 0.00     Types: Cigarettes     Quit date: 2021     Years since quittin.4    Smokeless tobacco: Never   Vaping Use    Vaping Use: Never used   Substance Use Topics    Alcohol use: No    Drug use: Yes     Types: Marijuana     Comment: rare     Physical Exam:  Vitals:    23 1633   BP: 119/63   Pulse: 70   Temp: 36.3 °C (97.3 °F)   SpO2: 98%     Body mass index is 28.77 kg/m².  Physical Exam  Constitutional:       General: She is not in acute distress.     Appearance: Normal appearance.   HENT:      Head: Normocephalic and atraumatic.      Right Ear: External ear normal.      Left Ear: External ear normal.   Eyes:      General: No scleral icterus.     Extraocular Movements: Extraocular movements intact.      Conjunctiva/sclera: Conjunctivae normal.   Cardiovascular:      Rate and Rhythm: Normal rate and regular rhythm.      Heart sounds: Normal heart sounds. No murmur heard.  Pulmonary:      Effort: No respiratory distress.      Breath sounds: Normal breath sounds.   Abdominal:      General: There is no distension.      Palpations: Abdomen is soft.   Musculoskeletal:         General: No swelling or tenderness.      Cervical back: Normal range of motion.      Right lower leg: No edema.      Left lower leg: No edema.      Comments: No external tenderness to palpation of left knee. Mild posterior knee pain upon palpation.  Neg anterior/posterior drawer test, neg yumi. Neg valgus/varus.  +Thessaly test on left knee. No midspinal tenderness. No LCL tenderness. No erythema/swelling   Skin:     General: Skin is warm and dry.   Neurological:      General: No focal deficit present.      Mental Status: She is alert.   Psychiatric:         Mood and Affect: Mood normal.         Behavior: Behavior normal.          Assessment/Plan:  1. Acute pain of left knee  Likely soft tissue vs meniscal tear of left knee. + thesally's. Less likely OA based on clinical exam, acute, and hx. Does not appear infecitous at this time. Worsened with bearing weight. No erythema/swelling/fever. Pt requesting imaging- MRI, despite education/recommendation about conservative therapy at this time and MRI would not  at this time.  - recommend physical therapy for conservative tx at this time, pt declined this   - tylenol/ibuprofen prn  - MR-KNEE-W/O LEFT; Future     All imaging results and lab results and consult notes are reviewed at this visit.  Followup: Return in about 5 weeks (around 10/10/2023).      Praveena Clancy, DO  Internal Medicine PGY-3

## 2023-09-08 ASSESSMENT — ENCOUNTER SYMPTOMS
CHILLS: 0
BACK PAIN: 1
MYALGIAS: 0
SHORTNESS OF BREATH: 0
COUGH: 0
EYES NEGATIVE: 1
FEVER: 0
ABDOMINAL PAIN: 0
NAUSEA: 0
VOMITING: 0

## 2023-10-25 ENCOUNTER — GYNECOLOGY VISIT (OUTPATIENT)
Dept: OBGYN | Facility: CLINIC | Age: 33
End: 2023-10-25
Payer: MEDICAID

## 2023-10-25 VITALS — WEIGHT: 160 LBS | DIASTOLIC BLOOD PRESSURE: 62 MMHG | BODY MASS INDEX: 28.34 KG/M2 | SYSTOLIC BLOOD PRESSURE: 102 MMHG

## 2023-10-25 DIAGNOSIS — T83.9XXA COMPLICATION OF INTRAUTERINE DEVICE (IUD), UNSPECIFIED COMPLICATION, INITIAL ENCOUNTER (HCC): Primary | ICD-10-CM

## 2023-10-25 PROCEDURE — 99213 OFFICE O/P EST LOW 20 MIN: CPT | Performed by: OBSTETRICS & GYNECOLOGY

## 2023-10-25 PROCEDURE — 3078F DIAST BP <80 MM HG: CPT | Performed by: OBSTETRICS & GYNECOLOGY

## 2023-10-25 PROCEDURE — 3074F SYST BP LT 130 MM HG: CPT | Performed by: OBSTETRICS & GYNECOLOGY

## 2023-10-25 ASSESSMENT — FIBROSIS 4 INDEX: FIB4 SCORE: .7778174593052022767

## 2023-10-25 NOTE — PROGRESS NOTES
"GYN PROBLEM VISIT    CC:  back pain since IUD insertion     HPI: Patient is a 32 y.o. who complains of back pain that is at times so severe that she is unable to go to work.  States she had a paraguard before and does remember it initially causing pain such that Dr. Burns gave her vicodin for a bit for pain. She denies having taken iburprofen or anything for pain but states she \"should have tried that.\"      She does report heavy and painful menses with paraguard and while she was counseled on option of Mirena IUD at insertion she states maybe she should have thought about it more deeply or didn't truly understand the differences and just went with what she always had.    She report starting menses today       ROS:   General: denies fever / chills  HEENT: denies sore throat:  CV: denies chest pain:  Repiratory: denies shortness of breath  GI: denies abdominal pain  : denies dysuria:    PFSH:  I personally reviewed the past medical and surgical histories.     Social History     Tobacco Use    Smoking status: Former     Current packs/day: 0.00     Types: Cigarettes     Quit date: 2021     Years since quittin.5    Smokeless tobacco: Never   Vaping Use    Vaping Use: Never used   Substance Use Topics    Alcohol use: No    Drug use: Yes     Types: Marijuana     Comment: rare       Social History     Substance and Sexual Activity   Sexual Activity Yes    Partners: Male    Birth control/protection: I.U.D.    Comment: . Paragard IUD        ALLERGIES / REACTIONS:  Allergies   Allergen Reactions    Sulfa Drugs Unspecified     RXN= unknown as a kid                           PHYSICAL EXAMINATION:  Vital Signs:   Vitals:    10/25/23 0923   BP: 102/62   BP Location: Left arm   Patient Position: Sitting   BP Cuff Size: Adult   Weight: 160 lb     Body mass index is 28.34 kg/m².    Gen: appears well, NAD  Respiratory: normal effort  Abdomen: Soft, non-tender.  Pelvic Exam: exam deferred today due to menses and need " "for US to eval IUD position. Will perform exam at follow up visit after US results and with possible plan to remove and replace IUD at that time if desired pending US results    ASSESSMENT AND PLAN:  32 y.o. No obstetric history on file.   1. Complication of intrauterine device (IUD), unspecified complication, initial encounter (HCC)   - will first obtain US to check IUD placement and look for possible myometrial invasion or perforation that may be causing pain   - as such, exam deferred today as \"string check\" won't tell us anything about IUD placement above   - will have pt schedule procedure visit to follow up for possible IUD removal and replacement of IUD if amenable via US results   - discussed the menstrual benefits of Mirena as well as usually lower risk of insertion complications such as perforation   - pt's  also states he will look into vasectomy so that pt would not require IUD or other form of contraception    - US-PELVIC COMPLETE (TRANSABDOMINAL/TRANSVAGINAL) (COMBO); Future        Kamila Arriaga D.O.      "

## 2023-11-17 ENCOUNTER — HOSPITAL ENCOUNTER (OUTPATIENT)
Dept: RADIOLOGY | Facility: MEDICAL CENTER | Age: 33
End: 2023-11-17
Attending: OBSTETRICS & GYNECOLOGY
Payer: MEDICAID

## 2023-11-17 DIAGNOSIS — T83.9XXA COMPLICATION OF INTRAUTERINE DEVICE (IUD), UNSPECIFIED COMPLICATION, INITIAL ENCOUNTER (HCC): ICD-10-CM

## 2023-11-17 PROCEDURE — 76830 TRANSVAGINAL US NON-OB: CPT

## 2024-05-13 ENCOUNTER — APPOINTMENT (OUTPATIENT)
Dept: INTERNAL MEDICINE | Facility: OTHER | Age: 34
End: 2024-05-13
Payer: MEDICAID

## 2024-05-13 ENCOUNTER — OFFICE VISIT (OUTPATIENT)
Dept: INTERNAL MEDICINE | Facility: OTHER | Age: 34
End: 2024-05-13
Payer: MEDICAID

## 2024-05-13 VITALS
OXYGEN SATURATION: 98 % | BODY MASS INDEX: 27.21 KG/M2 | HEIGHT: 63 IN | DIASTOLIC BLOOD PRESSURE: 71 MMHG | WEIGHT: 153.6 LBS | TEMPERATURE: 98 F | HEART RATE: 67 BPM | SYSTOLIC BLOOD PRESSURE: 112 MMHG

## 2024-05-13 DIAGNOSIS — R53.1 WEAKNESS: ICD-10-CM

## 2024-05-13 DIAGNOSIS — L60.3 KOILONYCHIA: ICD-10-CM

## 2024-05-13 DIAGNOSIS — Z11.59 NEED FOR HEPATITIS C SCREENING TEST: ICD-10-CM

## 2024-05-13 DIAGNOSIS — Z11.4 SCREENING FOR HIV (HUMAN IMMUNODEFICIENCY VIRUS): ICD-10-CM

## 2024-05-13 DIAGNOSIS — R42 VERTIGO: ICD-10-CM

## 2024-05-13 DIAGNOSIS — Z13.228 SCREENING FOR METABOLIC DISORDER: ICD-10-CM

## 2024-05-13 DIAGNOSIS — N83.209 CYST OF OVARY, UNSPECIFIED LATERALITY: ICD-10-CM

## 2024-05-13 PROBLEM — S29.019A THORACIC MYOFASCIAL STRAIN: Status: RESOLVED | Noted: 2018-07-12 | Resolved: 2024-05-13

## 2024-05-13 PROBLEM — R30.0 DYSURIA: Status: RESOLVED | Noted: 2018-07-12 | Resolved: 2024-05-13

## 2024-05-13 PROBLEM — R19.7 DIARRHEA: Status: RESOLVED | Noted: 2023-03-09 | Resolved: 2024-05-13

## 2024-05-13 PROCEDURE — 99214 OFFICE O/P EST MOD 30 MIN: CPT | Mod: GC

## 2024-05-13 PROCEDURE — 3074F SYST BP LT 130 MM HG: CPT | Mod: GC

## 2024-05-13 PROCEDURE — 3078F DIAST BP <80 MM HG: CPT | Mod: GC

## 2024-05-13 ASSESSMENT — ENCOUNTER SYMPTOMS
GASTROINTESTINAL NEGATIVE: 1
PSYCHIATRIC NEGATIVE: 1
MUSCULOSKELETAL NEGATIVE: 1
CARDIOVASCULAR NEGATIVE: 1
DIZZINESS: 1
CONSTITUTIONAL NEGATIVE: 1
RESPIRATORY NEGATIVE: 1
EYES NEGATIVE: 1

## 2024-05-13 ASSESSMENT — PATIENT HEALTH QUESTIONNAIRE - PHQ9: CLINICAL INTERPRETATION OF PHQ2 SCORE: 0

## 2024-05-13 NOTE — PROGRESS NOTES
"l    Established Patient    Patient Care Team:  Salvador Brito M.D. as PCP - General (Internal Medicine)  Pcp Pt States None    Frances Panchal is a 33 y.o. female who presents today with the following Chief Complaint(s): Follow up for Diagnoses of Need for hepatitis C screening test, Screening for HIV (human immunodeficiency virus), Screening for metabolic disorder, Koilonychia, Cyst of ovary, unspecified laterality, Weakness, and Vertigo were pertinent to this visit.    HPI:  Frances Panchal is a 34 y/o female patient who presented today for establishment of care. Pt reports \"elevation of finger nails\" at this visit. Pt also reports fatigue and dizziness that she developed an episode of on Saturday. She reports that she developed a sudden onset headache that was followed by the dizziness. She reports the dizziness as the room spinning. Reports feeling significantly better. She reports she has had one symptom similar to this in the past, 4-6 months ago, which was not accompanied with headache. She reports it improved on its own. Neurologic examination is benign, there is no motor/sensory deficits. [place]-Hallpike test revealed MILD lightheadedness on the left, normal on the right. No nystagmus noted on kelechi-hallpike. Pt reports resolution of symptoms, but would like to be worked up.    Pt denies any active bleeds, however, does report history of heavy menstrual bleeds, oligomenorrhea, abnormal menstrual cycles. She reports improvement in cycle frequency. She reports continued heavy menstrual bleeds. Last bleeds were 2-3 weeks ago.     /71 (BP Location: Left arm, Patient Position: Sitting, BP Cuff Size: Adult)   Pulse 67   Temp 36.7 °C (98 °F) (Temporal)   Ht 1.6 m (5' 3\")   Wt 69.7 kg (153 lb 9.6 oz)   SpO2 98%   BMI 27.21 kg/m²   Review of Systems   Constitutional: Negative.    HENT: Negative.     Eyes: Negative.    Respiratory: Negative.     Cardiovascular: Negative.    Gastrointestinal: Negative.  " "  Genitourinary: Negative.    Musculoskeletal: Negative.    Skin: Negative.    Neurological:  Positive for dizziness.   Endo/Heme/Allergies: Negative.    Psychiatric/Behavioral: Negative.         Past Medical History:   Diagnosis Date    Dental disorder     missing teeth, cracked front tooth    Diabetes (HCC)     \"on the verge of diabetes\"    High cholesterol     \"on the verge\"    Pain     abdominal pain from gall bladder     Social History     Tobacco Use    Smoking status: Former     Current packs/day: 0.00     Types: Cigarettes     Quit date: 4/1/2021     Years since quitting: 3.1    Smokeless tobacco: Never   Vaping Use    Vaping Use: Never used   Substance Use Topics    Alcohol use: No    Drug use: Yes     Types: Marijuana     Comment: rare     No current outpatient medications on file.     No current facility-administered medications for this visit.         Physical Exam  Constitutional:       Appearance: Normal appearance.   HENT:      Head: Normocephalic and atraumatic.      Right Ear: Tympanic membrane normal.      Left Ear: Tympanic membrane normal.      Nose: Nose normal.      Mouth/Throat:      Mouth: Mucous membranes are moist.   Eyes:      Extraocular Movements: Extraocular movements intact.      Conjunctiva/sclera: Conjunctivae normal.   Cardiovascular:      Rate and Rhythm: Normal rate and regular rhythm.      Pulses: Normal pulses.      Heart sounds: Normal heart sounds.   Pulmonary:      Effort: Pulmonary effort is normal.      Breath sounds: Normal breath sounds.   Abdominal:      General: Bowel sounds are normal.      Palpations: Abdomen is soft.   Musculoskeletal:         General: Normal range of motion.      Cervical back: Normal range of motion and neck supple.   Skin:     General: Skin is warm.   Neurological:      General: No focal deficit present.      Mental Status: She is alert and oriented to person, place, and time. Mental status is at baseline.   Psychiatric:         Mood and Affect: " Mood normal.         Behavior: Behavior normal.         Thought Content: Thought content normal.         Judgment: Judgment normal.         Assessment and Plan:   1. Need for hepatitis C screening test  Pt elected to undergo HCV screening at this visit.  - HEP C VIRUS ANTIBODY; Future    2. Screening for HIV (human immunodeficiency virus)  Pt elected to undergo HIV screening at this visit.  - HIV AG/AB COMBO ASSAY SCREENING; Future    3. Screening for metabolic disorder  Ordering lab work to screen patient for metabolic disorder at this time. Last lab work was completed in 2021. Will follow and manage patient accordingly.  - CBC WITH DIFFERENTIAL; Future  - Comp Metabolic Panel; Future  - HEMOGLOBIN A1C; Future  - Lipid Profile; Future    4. Koilonychia  Pt reports finger-nail elevations, which were also noted on physical examination. There is elevation of the fingernail bases diffusely, with indentations in some of the fingernails of bilateral hands. I would like to assess the patient for iron deficiency due to the association of koilonychia with LAYA, and also due to the patient reporting a history of heavy menstrual bleeding. Will replete iron as needed.  - IRON/TOTAL IRON BIND; Future  - FERRITIN; Future    5. Cyst of ovary, unspecified laterality  Referring patient to gynecology for evaluation of ovarian cysts previously noted. Also referring to allow patient to establish care with women's health. Will continue to follow.  - Referral to Gynecology    6. Vertigo  Pt reports episode of vertigo that occurred on Saturday, which the patient reports as feeling the room spin. She reports an onset of sudden headache prior to the episode of dizziness. Reports one episode of similar dizziness 4-6 months ago which self-resolved, and was not associated with new onset headache. Pt describes band-like pressure headache prior to the dizziness episode. To assess for causes of vertigo, [place]-hallpike test was completed, and was  revealing of mild lightheadedness onn the left, no symptoms on the right, and there was no nystagmus throughout. To evaluate for concerning causes, ordering MRI brain w/o contrast. Will manage patient accordingly. Pt also instructed to visit ER in setting of continuing episodes + worsening of symptoms, which she agreed with. Pt also reported taking neuriva (brain supplement?) for memory, which she began recently. Pt counseled on possible adverse effects of the supplement, and agreed to discontinue it.  - MR-BRAIN-W/O; Future        No problem-specific Assessment & Plan notes found for this encounter.      Orders Placed This Encounter    MR-BRAIN-W/O    CBC WITH DIFFERENTIAL    Comp Metabolic Panel    HEMOGLOBIN A1C    Lipid Profile    HIV AG/AB COMBO ASSAY SCREENING    HEP C VIRUS ANTIBODY    IRON/TOTAL IRON BIND    FERRITIN    Referral to Gynecology       Return in about 6 weeks (around 6/24/2024).    Salvador Brito M.D. PGY I  Internal Medicine  [facility]

## 2024-05-13 NOTE — PROGRESS NOTES
"Teaching Physician Attestation      Level of Participation    I have personally interviewed and examined the patient.  In addition, I discussed with the resident physician the patient's history, exam, assessment and plan in detail.  Topics listed in my addendum were the focus of the visit.  Healthcare maintenance was not addressed this visit unless listed as a topic in my addendum.  I agree with the plan as written along with the following additions/modifications:    New Resident PCP    [facility]: bmi 26, history of breast mass with neg US and Mammogram in 2021, anxiety per patient report, marijuana use  Fam hx brain and stomach cancers, need to clarify specifics        Vertigo in the setting of likely tension headaches and supplement use, resolved  -Patient reports sudden onset of severe headache in the middle of the night over the weekend, describes headache as a \"pressure\" that was bilateral, nonpulsatile, no nausea and vomiting, no recent head trauma, lasted many hours and then subsequently resolved.  During this she also had symptoms of the room spinning at 1 point, but this also resolved.  Denied ear pain, denied fevers.  On exam she has clear tympanic membranes bilaterally and an unremarkable focused neurologic exam.  Of note, she has extremely tight trapezius muscles bilaterally on palpation.  Kelechi hallpike did not reproduce the patients symptoms or create nystagmus per report. Denies any changes to her marijuana use recently    Plan  -Discussed massage/working to reduce shoulder muscle tension  -Stop supplement use that she recently started as reported some intermittent strange sensations related to the use  -Offered to discuss anxiety in more detail at future visit, offered anxiety resources handout, will provide at next visit  -Intracranial bleeding or masses, which would be on the differential during her acute presentation, are lower on the differential given complete resolution of her symptoms and benign " neurologic exam several days out from the visit.  Discussed the possibility for completion of obtaining MRI without contrast, for shared decision making patient interested in proceeding with MRI, will order    Metabolic screening labs ordered to be reviewed at future visit.  Mild hypokalemia noted on prior labwork, will repeat lab to ensure normalization.    Of note, patient did not complete MRI knee imaging ordrered 9/2023, and did not mention knee discomfort during this visit while I was in the room with Dr. Brito.    IUD (mirena) with prior irregular menses, now regular but with heavy initial cycle  This was not mentioned to me by the patient when I was in the room.  Dr. Brito reports that the patient noted prior history of irregular menses, now regular but with heavier than normal bleeding only at the beginning of the cycle, chronic.  Patient requesting re-establishment with ob/gyn.  Will refer to women's health to establish care and checking cbc and iron studies also related to this in interim.  Appreciate ob/gyn support.      Return to clinic in approximately 1 month.  PCR.

## 2024-05-27 ENCOUNTER — APPOINTMENT (OUTPATIENT)
Dept: RADIOLOGY | Facility: MEDICAL CENTER | Age: 34
End: 2024-05-27
Payer: MEDICAID

## 2024-06-05 ENCOUNTER — HOSPITAL ENCOUNTER (OUTPATIENT)
Dept: RADIOLOGY | Facility: MEDICAL CENTER | Age: 34
End: 2024-06-05
Payer: MEDICAID

## 2024-06-05 ENCOUNTER — HOSPITAL ENCOUNTER (OUTPATIENT)
Dept: LAB | Facility: MEDICAL CENTER | Age: 34
End: 2024-06-05
Payer: MEDICAID

## 2024-06-05 DIAGNOSIS — Z11.4 SCREENING FOR HIV (HUMAN IMMUNODEFICIENCY VIRUS): ICD-10-CM

## 2024-06-05 DIAGNOSIS — Z13.228 SCREENING FOR METABOLIC DISORDER: ICD-10-CM

## 2024-06-05 DIAGNOSIS — Z11.59 NEED FOR HEPATITIS C SCREENING TEST: ICD-10-CM

## 2024-06-05 DIAGNOSIS — R42 VERTIGO: ICD-10-CM

## 2024-06-05 DIAGNOSIS — L60.3 KOILONYCHIA: ICD-10-CM

## 2024-06-05 LAB
ALBUMIN SERPL BCP-MCNC: 4.7 G/DL (ref 3.2–4.9)
ALBUMIN/GLOB SERPL: 1.9 G/DL
ALP SERPL-CCNC: 71 U/L (ref 30–99)
ALT SERPL-CCNC: 41 U/L (ref 2–50)
ANION GAP SERPL CALC-SCNC: 12 MMOL/L (ref 7–16)
AST SERPL-CCNC: 30 U/L (ref 12–45)
BASOPHILS # BLD AUTO: 0.4 % (ref 0–1.8)
BASOPHILS # BLD: 0.04 K/UL (ref 0–0.12)
BILIRUB SERPL-MCNC: 0.3 MG/DL (ref 0.1–1.5)
BUN SERPL-MCNC: 13 MG/DL (ref 8–22)
CALCIUM ALBUM COR SERPL-MCNC: 8.8 MG/DL (ref 8.5–10.5)
CALCIUM SERPL-MCNC: 9.4 MG/DL (ref 8.5–10.5)
CHLORIDE SERPL-SCNC: 105 MMOL/L (ref 96–112)
CHOLEST SERPL-MCNC: 181 MG/DL (ref 100–199)
CO2 SERPL-SCNC: 24 MMOL/L (ref 20–33)
CREAT SERPL-MCNC: 0.66 MG/DL (ref 0.5–1.4)
EOSINOPHIL # BLD AUTO: 0.06 K/UL (ref 0–0.51)
EOSINOPHIL NFR BLD: 0.6 % (ref 0–6.9)
ERYTHROCYTE [DISTWIDTH] IN BLOOD BY AUTOMATED COUNT: 45.5 FL (ref 35.9–50)
EST. AVERAGE GLUCOSE BLD GHB EST-MCNC: 111 MG/DL
FERRITIN SERPL-MCNC: 26.2 NG/ML (ref 10–291)
GFR SERPLBLD CREATININE-BSD FMLA CKD-EPI: 118 ML/MIN/1.73 M 2
GLOBULIN SER CALC-MCNC: 2.5 G/DL (ref 1.9–3.5)
GLUCOSE SERPL-MCNC: 108 MG/DL (ref 65–99)
HBA1C MFR BLD: 5.5 % (ref 4–5.6)
HCT VFR BLD AUTO: 43.1 % (ref 37–47)
HCV AB SER QL: NORMAL
HDLC SERPL-MCNC: 74 MG/DL
HGB BLD-MCNC: 14.4 G/DL (ref 12–16)
HIV 1+2 AB+HIV1 P24 AG SERPL QL IA: NORMAL
IMM GRANULOCYTES # BLD AUTO: 0.1 K/UL (ref 0–0.11)
IMM GRANULOCYTES NFR BLD AUTO: 1 % (ref 0–0.9)
IRON SATN MFR SERPL: 17 % (ref 15–55)
IRON SERPL-MCNC: 81 UG/DL (ref 40–170)
LDLC SERPL CALC-MCNC: 93 MG/DL
LYMPHOCYTES # BLD AUTO: 2.13 K/UL (ref 1–4.8)
LYMPHOCYTES NFR BLD: 22.2 % (ref 22–41)
MCH RBC QN AUTO: 30.6 PG (ref 27–33)
MCHC RBC AUTO-ENTMCNC: 33.4 G/DL (ref 32.2–35.5)
MCV RBC AUTO: 91.7 FL (ref 81.4–97.8)
MONOCYTES # BLD AUTO: 0.5 K/UL (ref 0–0.85)
MONOCYTES NFR BLD AUTO: 5.2 % (ref 0–13.4)
NEUTROPHILS # BLD AUTO: 6.76 K/UL (ref 1.82–7.42)
NEUTROPHILS NFR BLD: 70.6 % (ref 44–72)
NRBC # BLD AUTO: 0 K/UL
NRBC BLD-RTO: 0 /100 WBC (ref 0–0.2)
PLATELET # BLD AUTO: 251 K/UL (ref 164–446)
PMV BLD AUTO: 10.3 FL (ref 9–12.9)
POTASSIUM SERPL-SCNC: 4.3 MMOL/L (ref 3.6–5.5)
PROT SERPL-MCNC: 7.2 G/DL (ref 6–8.2)
RBC # BLD AUTO: 4.7 M/UL (ref 4.2–5.4)
SODIUM SERPL-SCNC: 141 MMOL/L (ref 135–145)
TIBC SERPL-MCNC: 480 UG/DL (ref 250–450)
TRIGL SERPL-MCNC: 70 MG/DL (ref 0–149)
UIBC SERPL-MCNC: 399 UG/DL (ref 110–370)
WBC # BLD AUTO: 9.6 K/UL (ref 4.8–10.8)

## 2024-06-05 PROCEDURE — 80053 COMPREHEN METABOLIC PANEL: CPT

## 2024-06-05 PROCEDURE — 83036 HEMOGLOBIN GLYCOSYLATED A1C: CPT

## 2024-06-05 PROCEDURE — 83540 ASSAY OF IRON: CPT

## 2024-06-05 PROCEDURE — 82728 ASSAY OF FERRITIN: CPT

## 2024-06-05 PROCEDURE — 36415 COLL VENOUS BLD VENIPUNCTURE: CPT

## 2024-06-05 PROCEDURE — 70551 MRI BRAIN STEM W/O DYE: CPT

## 2024-06-05 PROCEDURE — 85025 COMPLETE CBC W/AUTO DIFF WBC: CPT

## 2024-06-05 PROCEDURE — 86803 HEPATITIS C AB TEST: CPT

## 2024-06-05 PROCEDURE — 80061 LIPID PANEL: CPT

## 2024-06-05 PROCEDURE — 83550 IRON BINDING TEST: CPT

## 2024-06-05 PROCEDURE — 87389 HIV-1 AG W/HIV-1&-2 AB AG IA: CPT

## 2024-06-17 ENCOUNTER — APPOINTMENT (OUTPATIENT)
Dept: INTERNAL MEDICINE | Facility: OTHER | Age: 34
End: 2024-06-17
Payer: MEDICAID

## 2024-06-18 ENCOUNTER — TELEMEDICINE (OUTPATIENT)
Dept: INTERNAL MEDICINE | Facility: OTHER | Age: 34
End: 2024-06-18
Payer: MEDICAID

## 2024-06-18 VITALS — WEIGHT: 158 LBS | BODY MASS INDEX: 27.99 KG/M2

## 2024-06-18 DIAGNOSIS — R53.1 GENERALIZED WEAKNESS: ICD-10-CM

## 2024-06-18 PROBLEM — R22.1 NECK MASS: Status: RESOLVED | Noted: 2018-07-12 | Resolved: 2024-06-18

## 2024-06-18 PROBLEM — N63.0 PERSISTENT NODULARITY OF BREAST: Status: RESOLVED | Noted: 2018-07-12 | Resolved: 2024-06-18

## 2024-06-18 RX ORDER — ASCORBIC ACID/VITAMIN E/BIOTIN 7.5MG-125
TABLET,CHEWABLE ORAL
COMMUNITY

## 2024-06-18 ASSESSMENT — FIBROSIS 4 INDEX: FIB4 SCORE: 0.63

## 2024-06-18 NOTE — PROGRESS NOTES
"    Established Patient     This evaluation was conducted via Zoom using secure and encrypted videoconferencing technology. The patient was in their home in the [facility].    The patient's identity was confirmed and verbal consent was obtained for this virtual visit.     Frances Panchal is a 33 y.o. female who presents today with the following Chief Complaint(s): Follow up for There were no encounter diagnoses.    HPI:  Frances Panchal is a 33 year old female patient here for follow up visit over telemedicine call. Pt's primary reason for the encounter is to discuss previous labs and Brain MRI. Pt also reports complaints of generalized fatigue for many months. Denies new changes in her lifestyle. Reports working the same duration + intensity recently, no overall changes. No aggravating or relieving factors associated with the fatigue. Denies fevers, chills, SOB, c/p, nausea, vomiting, diarrhea, constipation. Reports improvement in symptoms of dizziness previously discussed at last visit. No other complaints/concerns.    There were no vitals taken for this visit.    Review of Systems   Constitutional:  Positive for malaise/fatigue.   HENT: Negative.     Eyes: Negative.    Respiratory: Negative.     Cardiovascular: Negative.    Gastrointestinal: Negative.    Genitourinary: Negative.    Musculoskeletal: Negative.    Skin: Negative.    Neurological: Negative.    Endo/Heme/Allergies: Negative.    Psychiatric/Behavioral: Negative.         Past Medical History:   Diagnosis Date    Dental disorder     missing teeth, cracked front tooth    Diabetes (HCC)     \"on the verge of diabetes\"    High cholesterol     \"on the verge\"    Pain     abdominal pain from gall bladder     Social History     Tobacco Use    Smoking status: Former     Current packs/day: 0.00     Types: Cigarettes     Quit date: 4/1/2021     Years since quitting: 3.2    Smokeless tobacco: Never   Vaping Use    Vaping status: Never Used   Substance Use Topics "    Alcohol use: No    Drug use: Yes     Types: Marijuana     Comment: rare     No current outpatient medications on file.     No current facility-administered medications for this visit.         Physical Exam  Difficult to complete due to online nature of visit.    Assessment and Plan:   1. Generalized weakness  Pt complains of generalized weakness over many months. Also reports associated tension-like headaches. Has not medicated for the condition, denies relieving factors. Denies low mood, anhedonia. At this time, would like to assess patient for correctable causes of generalized weakness, and am ordering a TSH, B12, folate, and vitamin D. Will f/u at next visit.    - VITAMIN D,25 HYDROXY (DEFICIENCY); Future  - TSH WITH REFLEX TO FT4; Future  - VITAMIN B12; Future  - FOLATE; Future        No orders of the defined types were placed in this encounter.      No follow-ups on file.    Salvador Brito M.D. PGY I  Internal Medicine  [facility]

## 2024-06-19 ASSESSMENT — ENCOUNTER SYMPTOMS
RESPIRATORY NEGATIVE: 1
CARDIOVASCULAR NEGATIVE: 1
EYES NEGATIVE: 1
NEUROLOGICAL NEGATIVE: 1
PSYCHIATRIC NEGATIVE: 1
GASTROINTESTINAL NEGATIVE: 1
MUSCULOSKELETAL NEGATIVE: 1

## 2024-09-11 ENCOUNTER — APPOINTMENT (OUTPATIENT)
Dept: INTERNAL MEDICINE | Facility: OTHER | Age: 34
End: 2024-09-11
Payer: MEDICAID

## 2024-09-17 ENCOUNTER — HOSPITAL ENCOUNTER (OUTPATIENT)
Dept: LAB | Facility: MEDICAL CENTER | Age: 34
End: 2024-09-17
Payer: MEDICAID

## 2024-09-17 DIAGNOSIS — R53.1 GENERALIZED WEAKNESS: ICD-10-CM

## 2024-09-17 LAB
25(OH)D3 SERPL-MCNC: 19 NG/ML (ref 30–100)
FOLATE SERPL-MCNC: 13.4 NG/ML
TSH SERPL DL<=0.005 MIU/L-ACNC: 1.57 UIU/ML (ref 0.38–5.33)
VIT B12 SERPL-MCNC: 290 PG/ML (ref 211–911)

## 2024-09-17 PROCEDURE — 84443 ASSAY THYROID STIM HORMONE: CPT

## 2024-09-17 PROCEDURE — 82746 ASSAY OF FOLIC ACID SERUM: CPT

## 2024-09-17 PROCEDURE — 36415 COLL VENOUS BLD VENIPUNCTURE: CPT

## 2024-09-17 PROCEDURE — 82607 VITAMIN B-12: CPT

## 2024-09-17 PROCEDURE — 82306 VITAMIN D 25 HYDROXY: CPT

## 2024-09-20 ENCOUNTER — APPOINTMENT (OUTPATIENT)
Dept: RESEARCH | Facility: MEDICAL CENTER | Age: 34
End: 2024-09-20
Payer: MEDICAID

## 2024-09-23 DIAGNOSIS — B82.9 PARASITES IN STOOL: ICD-10-CM

## 2024-10-02 ENCOUNTER — TELEMEDICINE (OUTPATIENT)
Dept: INTERNAL MEDICINE | Facility: OTHER | Age: 34
End: 2024-10-02
Payer: MEDICAID

## 2024-10-02 DIAGNOSIS — E55.9 VITAMIN D DEFICIENCY: ICD-10-CM

## 2024-10-02 DIAGNOSIS — E61.1 IRON DEFICIENCY: ICD-10-CM

## 2024-10-02 PROCEDURE — 99213 OFFICE O/P EST LOW 20 MIN: CPT | Mod: 95,GE

## 2024-10-02 RX ORDER — ERGOCALCIFEROL 1.25 MG/1
1 CAPSULE ORAL
Qty: 10 CAPSULE | Refills: 0 | Status: SHIPPED | OUTPATIENT
Start: 2024-10-02

## 2024-10-07 ENCOUNTER — TELEPHONE (OUTPATIENT)
Dept: INTERNAL MEDICINE | Facility: OTHER | Age: 34
End: 2024-10-07
Payer: MEDICAID

## 2024-10-23 DIAGNOSIS — R10.84 DIFFUSE ABDOMINAL PAIN: ICD-10-CM

## 2024-11-27 ENCOUNTER — GYNECOLOGY VISIT (OUTPATIENT)
Dept: OBGYN | Facility: CLINIC | Age: 34
End: 2024-11-27
Payer: MEDICAID

## 2024-11-27 VITALS
WEIGHT: 161.4 LBS | DIASTOLIC BLOOD PRESSURE: 62 MMHG | HEIGHT: 64 IN | BODY MASS INDEX: 27.55 KG/M2 | SYSTOLIC BLOOD PRESSURE: 100 MMHG

## 2024-11-27 DIAGNOSIS — Z97.5 IUD (INTRAUTERINE DEVICE) IN PLACE: ICD-10-CM

## 2024-11-27 DIAGNOSIS — R10.2 PELVIC PAIN: ICD-10-CM

## 2024-11-27 PROCEDURE — 3074F SYST BP LT 130 MM HG: CPT | Performed by: STUDENT IN AN ORGANIZED HEALTH CARE EDUCATION/TRAINING PROGRAM

## 2024-11-27 PROCEDURE — 3078F DIAST BP <80 MM HG: CPT | Performed by: STUDENT IN AN ORGANIZED HEALTH CARE EDUCATION/TRAINING PROGRAM

## 2024-11-27 PROCEDURE — 99214 OFFICE O/P EST MOD 30 MIN: CPT | Performed by: STUDENT IN AN ORGANIZED HEALTH CARE EDUCATION/TRAINING PROGRAM

## 2024-11-27 ASSESSMENT — FIBROSIS 4 INDEX: FIB4 SCORE: 0.65

## 2024-11-27 NOTE — PROGRESS NOTES
Patient here for New patient GYN  LMP: 11/7/2024,   Last pap: 2/3/2023 WNL   # [phone] (home)   Pt states having pain on right lower side where ovary is and sometimes also having pain on her back

## 2024-12-02 NOTE — PROGRESS NOTES
Verbal consent was acquired by the patient to use [employer] ambient listening note generation during this visit Yes      Subjective   Frances Panchal is a 34 y.o. female who presents for the following:   History of Present Illness  The patient presents for evaluation of pelvic pain. She is accompanied by an adult male.    She reports experiencing pelvic pain following the insertion of a ParaGard device in 2023. She experiences severe pain one to two weeks before her period, which is so intense that it prevents her from moving or doing anything. She describes a constant dull ache on her right side and daily dull aches. She has a history of Nabothian cysts and a mass-like area, per ultrasound in November 2023 which she had due to pelvic pain, there was also a likely a resolving corpus luteum. She has not undergone any recent pelvic ultrasounds following the ultrasound last November. She denies any pain during intercourse.    She also mentions a hemorrhoid that has been present since the birth of her first child 18 years ago. This hemorrhoid occasionally bleeds and causes pain, particularly after bowel movements. She describes her stools as thin and shaped. The last episode of bleeding occurred a month ago. She denies constipation but reports severe diarrhea and pain prior to her gallbladder removal. She has a scheduled colonoscopy for her abnormal bowel movements, which she had to reschedule due to work commitments.    GYNHx: Menarche at 12.  Sexually active with  in a monogamous relationship. No STIs.  Had single abnormal Pap July 2018, LSIL.  All Paps reviewed prior were normal and last Pap January 2023 NILM, HPV neg. and has not had HPV vaccine.  ParaGard placed July 2023.      OBSTETRIC HISTORY:  She has three children, born in 2007, 2010, and 2013, all of whom were full term. She did not have gestational diabetes or high blood pressure during her pregnancies. She experienced complications with her  "first and second pregnancies, including meconium aspiration syndrome in her second child and jaundice in her first child. She has never had a blood transfusion.    FAMILY HISTORY  Her maternal aunt passed away from breast cancer in her late 40s or early 50s. Her paternal aunt had ovarian cancer and stomach cancer. Her father gets a lot of blood clots.    Past Surgical History:   Procedure Laterality Date    TIA BY LAPAROSCOPY  8/25/2021    Procedure: CHOLECYSTECTOMY, LAPAROSCOPIC.;  Surgeon: Sanchez Agee M.D.;  Location: SURGERY [facility];  Service: General    EXPLORATORY LAPAROTOMY  age 17    spleen hemorrage, not removed       IMMUNIZATIONS  She has not received HPV vaccine    Objective   /62   Ht 5' 4\"   Wt 161 lb 6.4 oz   Physical Exam  Vital Signs:   Vitals:    11/27/24 0844   BP: 100/62   Weight: 161 lb 6.4 oz   Height: 5' 4\"     Body mass index is 27.7 kg/m².  Constitutional: The patient is well developed and well nourished.  Psychiatric: Patient is oriented to time place and person.   Skin: No rash observed.  Neck: Neck appears symmetric. There are no masses or adenopathy present.  Respiratory: normal effort  Abdomen: Soft, non-tender.  Pelvic:    Vulva: normal.    Urethra: normal.   Vagina: normal.    Cervix: Cervix appears normal with no abnormal discharge. ParaGard strings are visible at the expected length.   Uterus: consistent with dates    Adnexa: Right-sided pelvic pain is not reproducible on exam. Uterus is mobile. No palpable ovarian cysts or abnormalities in the bilateral adnexa.position.   Perineum:  Rectal exam shows no obvious rectal masses in the anal canal. No blood on withdrawal of finger. Rectal tone is normal. Evidence of old hemorrhoid at 12 o'clock   Extremeties: Legs are symmetric and without tenderness. There is no edema present.        Results  Imaging  Pelvic ultrasound in November 2023 showed no abnormal masses, a corpus luteum, and Nabothian cysts, all of which are " normal findings.       11/17/2023 1:55 PM  HISTORY/REASON FOR EXAM:  Back pain since insertion of IUD in July. Check for abnormal placement or perforation (Paraguard)     TECHNIQUE/EXAM DESCRIPTION:  Transabdominal and transvaginal pelvic ultrasound.     COMPARISON:   None     FINDINGS:  Both transabdominal and transvaginal scanning were performed to optimally visualize the pelvis.     UTERUS:  The uterus measures 4.26 cm x 9.24 cm x 5.20 cm.  The uterine myometrium is within normal limits.     The endometrial echo complex measures 0.63 cm.  The endometrial echo complex is unremarkable in appearance for age and menstrual status.  The IUD is centrally located in the endometrium, normal in position sonographically.     There are incidental cervical nabothian cysts.     OVARIES:  The right ovary measures 4.05 cm x 2.00 cm x 2.35 cm. Duplex Doppler examination of the right ovary shows normal waveforms. Unremarkable with a 1.9 x 2 cm hypoechoic masslike area probably a resolving corpus luteum cyst or hemorrhagic follicle. 2nd   subcentimeter areas similar in appearance likely of the same etiology.  The left ovary measures 2.13 cm x 2.11 cm x 2.69 cm. Duplex Doppler examination of the left ovary shows normal waveforms. The left ovary is normal in size and appearance.  There is no free fluid seen.  IMPRESSION:     1.  The IUD appears normal in position sonographically.        Exam Ended: 11/17/23  2:42 PM Last Resulted: 11/17/23  2:46 PM     Assessment & Plan  1. Pelvic pain.  The pelvic pain could be due to ovulation, which can cause discomfort approximately 2 weeks before onset of menses.  Reviewed that the nabothian nabothian cysts and corpus luteum are normal findings and are likely not contributing to the pain she is having. The ParaGard is correctly positioned and is likely not causing pain she is experiencing.her Pap smear since 2019 have also been normal and HPV negative and are also likely not contributing to the  pain she has been having.     The pain may be related to mittelschmerz versus musculoskeletal pain or possibly some component of each.  Given that patient has a history of low back pain this may be a contributing cause.     I reviewed timing of mittelschmerz and type of pain associated with mittelschmerz.  I reviewed that a ParaGard IUD will not change the frequency of menstrual cycles nor cyst that naturally formed around ovulation time.      A repeat pelvic ultrasound will be ordered to further assess and compare to her last November ultrasound.  Additionally her  now has a vasectomy and if her 's vasectomy analysis is normal, she can consider having the ParaGard removed see if this helps prove her pain.     2. Hemorrhoids.  There is a history of hemorrhoids, but no actively bleeding or thrombosed hemorrhoid is currently present by exam today. Evidence of an old hemorrhoid at 12:00 was noted. She is advised to keep her colonoscopy appointment to investigate the cause of her thin stools and bleeding.     3. Health Maintenance.  She is advised to keep her colonoscopy appointment, which is already scheduled.  Reviewed that her next Pap smear is not due until January 2028.  I still recommend getting the HPV vaccine    4.  Family history of cancer.  Referred to [facility] [place] project in September 2022    -Will follow-up if ultrasound results are abnormal  -all questions answered and pt agreeable to plan of care    Kamila Aragon DO, [facility]    Please note that this dictation was created using voice recognition software. I have made every reasonable attempt to correct obvious errors, but I expect that there are errors of grammar and possibly content that I did not discover before finalizing the note.

## 2025-01-19 NOTE — TELEPHONE ENCOUNTER
-Blood Cx 1/14 growing staph.   -Repeat Blood Cx 1/16 negative thus far  -Started on Vanc/Zosyn.  -ID consult.   -Vanc and Zosyn changed to Ancef  -TTE and ophthalmology consult ordered per ID recommendations   -diagnostic paracentesis -negative so far, but cultures pending.    Spoke with patient by phone tonight.  Apologize for the delay in sending referral for GYN evaluation.  Patient's choice of GYN referral versus repeating Pap in 6 months did not get transmitted back to me.  We have ordered a urgent referral to gynecology to get their evaluation of her recent Pap smear findings of LGSIL.  Pelvic pain-patient reports that she has had recurrent episodes of left-sided pelvic pain with also some pain on intercourse.  She reports that menses are every 1-2 months with variable flow shortest 3 days or as long as 8 days.  Patient reports that for the past 5 years she has had significant pain with her actual menstrual periods and had taken some medication from Dr. Ba Burns several years ago.  We will proceed with a transvaginal pelvic ultrasound to evaluate.

## (undated) DEVICE — GOWN WARMING STANDARD FLEX - (30/CA)

## (undated) DEVICE — TUBE CONNECT SUCTION CLEAR 120 X 1/4" (50EA/CA)"

## (undated) DEVICE — TOWEL STOP TIMEOUT SAFETY FLAG (40EA/CA)

## (undated) DEVICE — NEPTUNE 4 PORT MANIFOLD - (20/PK)

## (undated) DEVICE — SODIUM CHL IRRIGATION 0.9% 1000ML (12EA/CA)

## (undated) DEVICE — SENSOR SPO2 NEO LNCS ADHESIVE (20/BX) SEE USER NOTES

## (undated) DEVICE — SUTURE GENERAL

## (undated) DEVICE — TUBING CLEARLINK DUO-VENT - C-FLO (48EA/CA)

## (undated) DEVICE — SUTURE 0 VICRYL PLUS UR-6 - 27 INCH (36/BX)

## (undated) DEVICE — TROCAR Z THREAD12MM OPTICAL - NON BLADED (6/BX)

## (undated) DEVICE — KIT ANESTHESIA W/CIRCUIT & 3/LT BAG W/FILTER (20EA/CA)

## (undated) DEVICE — BAG RETRIEVAL 10ML (10EA/BX)

## (undated) DEVICE — STERI STRIP COMPOUND BENZOIN - TINCTURE 0.6ML WITH APPLICATOR (40EA/BX)

## (undated) DEVICE — CHLORAPREP 26 ML APPLICATOR - ORANGE TINT(25/CA)

## (undated) DEVICE — SCISSORS 5MM CVD (6EA/BX)

## (undated) DEVICE — GLOVE BIOGEL PI INDICATOR SZ 7.5 SURGICAL PF LF -(50/BX 4BX/CA)

## (undated) DEVICE — MASK ANESTHESIA ADULT  - (100/CA)

## (undated) DEVICE — SET TUBING PNEUMOCLEAR HIGH FLOW SMOKE EVACUATION (10EA/BX)

## (undated) DEVICE — TROCAR 5X100 NON BLADED Z-TH - READ KII (6/BX)

## (undated) DEVICE — ELECTRODE 850 FOAM ADHESIVE - HYDROGEL RADIOTRNSPRNT (50/PK)

## (undated) DEVICE — CLOSURE SKIN STRIP 1/2 X 4 IN - (STERI STRIP) (50/BX 4BX/CA)

## (undated) DEVICE — SLEEVE, VASO, THIGH, MED

## (undated) DEVICE — PROTECTOR ULNA NERVE - (36PR/CA)

## (undated) DEVICE — SUCTION INSTRUMENT YANKAUER BULBOUS TIP W/O VENT (50EA/CA)

## (undated) DEVICE — SET EXTENSION WITH 2 PORTS (48EA/CA) ***PART #2C8610 IS A SUBSTITUTE*****

## (undated) DEVICE — GLOVE SZ 7 BIOGEL PI MICRO - PF LF (50PR/BX 4BX/CA)

## (undated) DEVICE — CLIP APPLIER 10MM ENDO LARGE (3EA/BX)

## (undated) DEVICE — LACTATED RINGERS INJ 1000 ML - (14EA/CA 60CA/PF)

## (undated) DEVICE — DRAPESURG STERI-DRAPE LONG - (10/BX 4BX/CA)

## (undated) DEVICE — ELECTRODE DUAL RETURN W/ CORD - (50/PK)

## (undated) DEVICE — HEAD HOLDER JUNIOR/ADULT

## (undated) DEVICE — BANDAID SHEER STRIP 3/4 IN (100EA/BX 12BX/CA)

## (undated) DEVICE — CANISTER SUCTION 3000ML MECHANICAL FILTER AUTO SHUTOFF MEDI-VAC NONSTERILE LF DISP  (40EA/CA)

## (undated) DEVICE — CANNULA W/SEAL 5X100 Z-THRE - ADED KII (12/BX)

## (undated) DEVICE — SET SUCTION/IRRIGATION WITH DISPOSABLE TIP (6/CA )PART #0250-070-520 IS A SUB

## (undated) DEVICE — SYRINGE DISP. 60 CC LL - (30/BX, 12BX/CA)**WHEN THESE ARE GONE ORDER #500206**

## (undated) DEVICE — SET LEADWIRE 5 LEAD BEDSIDE DISPOSABLE ECG (1SET OF 5/EA)

## (undated) DEVICE — SUTURE 4-0 VICRYL PLUSFS-1 - 27 INCH (36/BX)

## (undated) DEVICE — PACK LAP CHOLE OR - (2EA/CA)